# Patient Record
Sex: MALE | Race: WHITE | Employment: FULL TIME | URBAN - METROPOLITAN AREA
[De-identification: names, ages, dates, MRNs, and addresses within clinical notes are randomized per-mention and may not be internally consistent; named-entity substitution may affect disease eponyms.]

---

## 2017-05-30 ENCOUNTER — EMPLOYEE WELLNESS (OUTPATIENT)
Dept: OTHER | Age: 37
End: 2017-05-30

## 2017-05-30 LAB
CHOLESTEROL, TOTAL: 198 MG/DL (ref 0–199)
FASTING: YES
GLUCOSE BLD-MCNC: 95 MG/DL (ref 74–109)
HDLC SERPL-MCNC: 42 MG/DL (ref 40–90)
LDL CHOLESTEROL CALCULATED: 144 MG/DL
TRIGL SERPL-MCNC: 61 MG/DL (ref 0–199)

## 2017-08-15 ENCOUNTER — HOSPITAL ENCOUNTER (OUTPATIENT)
Dept: MRI IMAGING | Age: 37
Discharge: HOME | End: 2017-08-15
Payer: COMMERCIAL

## 2017-08-15 DIAGNOSIS — M25.561 ACUTE PAIN OF RIGHT KNEE: ICD-10-CM

## 2017-08-15 PROCEDURE — 73721 MRI JNT OF LWR EXTRE W/O DYE: CPT

## 2017-11-07 ENCOUNTER — HOSPITAL ENCOUNTER (OUTPATIENT)
Age: 37
Discharge: HOME | End: 2017-11-07
Payer: COMMERCIAL

## 2017-11-07 LAB
ALBUMIN SERPL-MCNC: 4.7 G/DL (ref 3.5–5.1)
ALP BLD-CCNC: 67 U/L (ref 38–126)
ALT SERPL-CCNC: 30 U/L (ref 11–66)
ANION GAP SERPL CALCULATED.3IONS-SCNC: 15 MEQ/L (ref 8–16)
AST SERPL-CCNC: 22 U/L (ref 5–40)
BILIRUB SERPL-MCNC: 0.3 MG/DL (ref 0.3–1.2)
BUN BLDV-MCNC: 16 MG/DL (ref 7–22)
CALCIUM SERPL-MCNC: 9.5 MG/DL (ref 8.5–10.5)
CHLORIDE BLD-SCNC: 102 MEQ/L (ref 98–111)
CO2: 25 MEQ/L (ref 23–33)
CREAT SERPL-MCNC: 0.8 MG/DL (ref 0.4–1.2)
GFR SERPL CREATININE-BSD FRML MDRD: > 90 ML/MIN/1.73M2
GLUCOSE BLD-MCNC: 104 MG/DL (ref 70–108)
HCT VFR BLD CALC: 42.2 % (ref 42–52)
HEMOGLOBIN: 14.1 GM/DL (ref 14–18)
MCH RBC QN AUTO: 29.4 PG (ref 27–31)
MCHC RBC AUTO-ENTMCNC: 33.4 GM/DL (ref 33–37)
MCV RBC AUTO: 88.1 FL (ref 80–94)
MRSA SCREEN RT-PCR: NEGATIVE
PDW BLD-RTO: 13.8 % (ref 11.5–14.5)
PLATELET # BLD: 292 THOU/MM3 (ref 130–400)
PMV BLD AUTO: 8.8 MCM (ref 7.4–10.4)
POTASSIUM SERPL-SCNC: 4.2 MEQ/L (ref 3.5–5.2)
RBC # BLD: 4.79 MILL/MM3 (ref 4.7–6.1)
SODIUM BLD-SCNC: 142 MEQ/L (ref 135–145)
TOTAL PROTEIN: 7.4 G/DL (ref 6.1–8)
WBC # BLD: 8.5 THOU/MM3 (ref 4.8–10.8)

## 2017-11-07 PROCEDURE — 87641 MR-STAPH DNA AMP PROBE: CPT

## 2017-11-07 PROCEDURE — 80053 COMPREHEN METABOLIC PANEL: CPT

## 2017-11-07 PROCEDURE — 36415 COLL VENOUS BLD VENIPUNCTURE: CPT

## 2017-11-07 PROCEDURE — 85027 COMPLETE CBC AUTOMATED: CPT

## 2017-11-20 ENCOUNTER — HOSPITAL ENCOUNTER (OUTPATIENT)
Dept: PHYSICAL THERAPY | Age: 37
Setting detail: THERAPIES SERIES
Discharge: HOME | End: 2017-11-20
Payer: COMMERCIAL

## 2017-11-20 PROCEDURE — 97110 THERAPEUTIC EXERCISES: CPT

## 2017-11-20 PROCEDURE — 97161 PT EVAL LOW COMPLEX 20 MIN: CPT

## 2017-11-20 ASSESSMENT — PAIN SCALES - GENERAL: PAINLEVEL_OUTOF10: 4

## 2017-11-20 ASSESSMENT — PAIN DESCRIPTION - LOCATION: LOCATION: LEG;KNEE

## 2017-11-20 ASSESSMENT — PAIN DESCRIPTION - PAIN TYPE: TYPE: CHRONIC PAIN

## 2017-11-20 ASSESSMENT — PAIN DESCRIPTION - ORIENTATION: ORIENTATION: RIGHT

## 2017-11-20 NOTE — PROGRESS NOTES
Caregiver Present: Yes (wife, Arturo Batista)  Comments: Follow up with Dr. Slick Smith on 11/28/17. Subjective: Patient reports of right ACL reconstruction with allograft on 11/17/17. He reports he injured his right leg playing volleyball. Sustained tibial plateau fracture,  MRI in August with ACL tear. .  Pain level 5/10 at right knee. Noted difficulty raising left leg up on bed. Limite strength and pain present. No difficutlies walking with crutches or entering exiting home. Been trying to put a little more pressure on Left leg when walking. Pain:  Patient Currently in Pain: Yes  Pain Assessment: 0-10  Pain Level: 4  Pain Type: Chronic pain  Pain Location: Leg, Knee  Pain Orientation: Right     Social/Functional History:    Lives With: Spouse  Type of Home: House  Home Layout: Two level, Able to Live on Main level with bedroom/bathroom  Home Access: Stairs to enter without rails  Entrance Stairs - Number of Steps: 2 + 1 steps   Home Equipment: Crutches     Bathroom Shower/Tub: Tub/Shower unit  Bathroom Toilet: Standard  Bathroom Accessibility: Accessible       ADL Assistance: Independent  Homemaking Assistance: Independent  Ambulation Assistance: Independent  Transfer Assistance: Independent    Active : Yes  Mode of Transportation: Car  Occupation: Full time employment  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 Objective        Observation/Palpation  Palpation: negative tenderness at right calf, not redness noted at leg or warmth. Note incisional sites with no drainage upon removal of dressing. Note mild drainage from hemovac tube site but stopped when pressure applied to site. Observation: Note dressingds and hemovac at right knee. Patient ambulating with bilateral axillary crutches toe touchdown wb. Knee immobilizer in place. ROM RLE: right knee ROM 0 to 55 degrees flexion. ROM LLE: left knee 0-140 degrees flexion. Comment: Right knee strength: not assessed due to recent surgery. Note hip strength flexors unable to lift to do SLR Required 90% assist to complete. Comment: LLE strength 5/5. Tone RLE  RLE Tone: Normotonic  Tone LLE  LLE Tone: Normotonic                  Overall Sensation Status: WNL           Supine to Sit: Minimal assistance (Assist with RLE. )  Sit to Supine: Minimal assistance (assist with RLE)                Transfers  Sit to Stand: Modified independent  Stand to sit: Modified independent            WB Status: 20-30 lbs wb through RLE using axillary crutches with knee immobilizaer RLE  Ambulation 1  Surface: carpet  Device: Axillary Crutches  Other Apparatus: Knee Immobilizer  Assistance: Modified Independent  Quality of Gait: Note proper gait pattern using bilateral axillary crutches with Toe touch to PWB RLE. Distance: within clinic                   Balance  Comments: 0 for balance at this time due to use of axillary crutches and recent surgery. Exercises  Exercise 1: ankle pumps, quad set, gluteal sets x10 reps   Exercise 2: heelslides x10   Exercise 3: assisted SLR 90% assist 2x5. Exercise 4: dressing change with removal of hemovac drain. mild drainage with pressure applied. No draiinage following 2 minutes of pressure. Activity Tolerance:  Activity Tolerance: Patient Tolerated treatment well, Patient limited by pain    Assessment: Body structures, Functions, Activity limitations: Decreased functional mobility , Decreased ROM, Decreased strength, Decreased ADL status  Assessment: Note patient only 3 days postop. Dressing removed along with hemovac drain. Note decreased knee ROM and strength, Gait with axillary crutches with toe touch wb. Patient to be followed with ACL protocol per Dr. Ciera Hebert. Will follow 2-3x per week for 8-12 weeks. Prognosis: Good  Discharge Recommendations: Continue to assess pending progress    Patient Education:  Patient Education: Patient educated in plan of care.  Patient initiated in ankle pumps,quad set and gluteal sets. heelslides active assistive, assisted SLR 90%. Cold/ice application. Watch for drainage from drain site. Apply pressure. Plan:  Times per week: 2-3x per week  Plan weeks: 8 weeks  Specific instructions for Next Treatment: progress with heelslides, 4 way leg raises, patellar mobilization, ICE. estim if indicated.  ice. See Dr. Alcaraz Never Protocol. Current Treatment Recommendations: Strengthening, ROM, Gait Training, Home Exercise Program, Stair training, Modalities (estim for muscle reeducation. )    History: Personal factors or comorbidities that impact plan of care - Low Complexity: no personal factors or comorbidities    Examination: Body structures and functions, activity limitations, participation restrictions; using standardized tests and measures - Moderate Complexity: 3 or morebody structures and functional, activity limitations and/or participation restrictions. See restrictions and objective section above for details. Clinical Presentation: Low - Stable and Uncomplicated: due to recent ACL reconstruction right knee post op day 3 working on ROM and strength, gait pattern/ambulation     Decision Making: Low Complexity due to see above for explanations. Decision making was based on patient assessment and decision making process in determining plan of care and establishing reasonable expectations for measurable functional outcomes. Evaluation Complexity: Based on the findings of patient history, examination, clinical presentation, and decision making during this evaluation, the evaluation of Lord Ball  is of low complexity. Goals:  Patient goals : To have less knee pain and walk eventually without crutches, return to work.      Short term goals  Time Frame for Short term goals: 4 weeks  Short term goal 1: Patient to report of decreased pain levels at right knee from 7/10 to no greater than 4-5/10 with walking with

## 2017-11-21 ENCOUNTER — HOSPITAL ENCOUNTER (OUTPATIENT)
Dept: PHYSICAL THERAPY | Age: 37
Setting detail: THERAPIES SERIES
Discharge: HOME | End: 2017-11-21
Payer: COMMERCIAL

## 2017-11-21 PROCEDURE — 97110 THERAPEUTIC EXERCISES: CPT

## 2017-11-21 ASSESSMENT — PAIN DESCRIPTION - LOCATION: LOCATION: KNEE

## 2017-11-21 ASSESSMENT — PAIN SCALES - GENERAL: PAINLEVEL_OUTOF10: 5

## 2017-11-21 ASSESSMENT — PAIN DESCRIPTION - ORIENTATION: ORIENTATION: RIGHT

## 2017-11-21 NOTE — PROGRESS NOTES
[address] [facility]     Time In: 1104  Time Out: 1969  Minutes: 48  Timed Code Treatment Minutes: 48 Minutes     Date: 2017  Patient Name: Mauro Borrero,  Gender:  male        CSN: [id number]   : 1980  (42 y.o.)       Referring Practitioner: Brandon Workman MD      Diagnosis: right ACL reconstruction   Treatment Diagnosis: s/p right knee ACL reconstruction surgery with difficulty walking and limited ADLs due to ROM and strength deficits. Additional Pertinent Hx: 17 injury to right knee with  tibial plateau fracture. 2017 continued healing fracture of tibial plateau,MRI revealed ACL tear. Surgery on 17 for ACL reconstruction with allograft                    General:  PT Visit Information  Onset Date: 17  PT Insurance Information: Medical [place]  aquatic therapy covered, modalites covered. Total # of Visits to Date: 2  Plan of Care/Certification Expiration Date: 01/15/18  Progress Note Counter: 2/10 for PN. Subjective:  Family / Caregiver Present: Yes  Comments: Follow up with Dr. Hortensia Vincent on 17. Subjective: Pt reporting pain 5/10- today and using ice and CPM. Pt reporting having very minimal drainage after drain was pulled at evaluation. Pain:  Patient Currently in Pain: Yes  Pain Assessment: 0-10  Pain Level: 5  Pain Location: Knee  Pain Orientation: Right      Objective   Exercises  Exercise 1: ankle pumps x 15. Quad set 15 x 5 seconds  Exercise 2: heel slides x10, heel slides assisted with blue strap x 10  Exercise 3: assisted SLR 90% assist 2x5. Left side lying hip abduction x 10 Mod I  Exercise 4: Right knee range of motion 0-80 degrees. Exercise 5: Long sitting hamstring stretch with L LE off bed 15 seconds x 3. R calf stretch with strap 15 seconds x 3.   Exercise 6: Airdyne seat 5 x 5 minutes rocking   Exercise 7: Hanging LE's off edge of bed x 1 minute  Exercise 8: Educated on patellar mobs on L knee both direction. Still has ace bandage on right knee. Activity Tolerance:  Activity Tolerance: Patient Tolerated treatment well    Assessment:  Assessment: Progressions made per Dr. Dora Marshall protocol with pt tolerating well. Did have noted hamstring tightness and still needing assist with SLR, but knee flexion has improved. Pt reporting pain 6/10 at end of session. Prognosis: Good       Patient Education:  Patient Education: Educated to wear shorts next session so [place] stimulation cane be done. Continue with HEP. and Gave hand out for side lying hip abduction, hamstring stretch and calf stretch. Plan:  Times per week: 2-3x per week  Plan weeks: 8 weeks  Specific instructions for Next Treatment: progress with heelslides, 4 way leg raises, patellar mobilization, ICE. estim if indicated.  ice. See Dr. Tashia Ybarra Protocol. Current Treatment Recommendations: Strengthening, ROM, Gait Training, Home Exercise Program, Stair training, Modalities  Plan Comment: Continue per POC. Goals:  Patient goals : To have less knee pain and walk eventually without crutches, return to work. Short term goals  Time Frame for Short term goals: 4 weeks  Short term goal 1: Patient to report of decreased pain levels at right knee from 7/10 to no greater than 4-5/10 with walking with axillary crutches and at rest.   Short term goal 2: Patient to demonstrate increased right knee ROM 0 degrees to 120 degrees flexion with increased ease with exercises   Short term goal 3: Patient to demonstrate increased strength RLE with strength at hip flexors 3/5 with ability to SLR and lift leg onto/off mat table. Short term goal 4: Patient to demonstrate improved walking pattern with functional knee brace with equal step length and improved wt shift.      Long term goals  Time Frame for Long term goals : 8 weeks  Long term goal 1: Patient to report of decreased pain levels at right knee from 4-5/10 to no greater thatn 0-2/10 with walking and ex program.   Long term goal 2: Patient to demonstrate full active right knee ROM 0-140 degrees flexion with normal gait pattern and ability to negotiate steps reciprocally. Long term goal 3: Patient to demonstrate increased strength at hip and knee musculature RLE 5/5 with increased stabiity in ambulation on level /unlevel terrain. Long term goal 4: Patient to demonstrate increased balance in SLS for 30 sec to 45 seconds with   increased progression to single Heelraises, side stepping on treadmill. Long term goal 5: Patient to demonstrate independence in home program with ability to meet above goals and revise past 8 weeks of PT.            Royal Pederson, [id number]

## 2017-11-22 ENCOUNTER — HOSPITAL ENCOUNTER (OUTPATIENT)
Dept: PHYSICAL THERAPY | Age: 37
Setting detail: THERAPIES SERIES
Discharge: HOME | End: 2017-11-22
Payer: COMMERCIAL

## 2017-11-22 PROCEDURE — 97110 THERAPEUTIC EXERCISES: CPT

## 2017-11-22 PROCEDURE — 97032 APPL MODALITY 1+ESTIM EA 15: CPT

## 2017-11-22 ASSESSMENT — PAIN DESCRIPTION - PAIN TYPE: TYPE: CHRONIC PAIN;SURGICAL PAIN

## 2017-11-22 ASSESSMENT — PAIN SCALES - GENERAL: PAINLEVEL_OUTOF10: 5

## 2017-11-22 ASSESSMENT — PAIN DESCRIPTION - ORIENTATION: ORIENTATION: RIGHT

## 2017-11-22 ASSESSMENT — PAIN DESCRIPTION - LOCATION: LOCATION: KNEE

## 2017-11-27 ENCOUNTER — HOSPITAL ENCOUNTER (OUTPATIENT)
Dept: PHYSICAL THERAPY | Age: 37
Setting detail: THERAPIES SERIES
Discharge: HOME | End: 2017-11-27
Payer: COMMERCIAL

## 2017-11-27 PROCEDURE — 97110 THERAPEUTIC EXERCISES: CPT

## 2017-11-27 ASSESSMENT — PAIN DESCRIPTION - ORIENTATION: ORIENTATION: RIGHT

## 2017-11-27 ASSESSMENT — PAIN SCALES - GENERAL: PAINLEVEL_OUTOF10: 6

## 2017-11-27 ASSESSMENT — PAIN DESCRIPTION - PAIN TYPE: TYPE: CHRONIC PAIN;SURGICAL PAIN

## 2017-11-27 ASSESSMENT — PAIN DESCRIPTION - LOCATION: LOCATION: KNEE

## 2017-11-27 NOTE — PROGRESS NOTES
hip flexors 3/5 with ability to SLR and lift leg onto/off mat table. Short term goal 4: Patient to demonstrate improved walking pattern with functional knee brace with equal step length and improved wt shift. Long term goals  Time Frame for Long term goals : 8 weeks  Long term goal 1: Patient to report of decreased pain levels at right knee from 4-5/10 to no greater thatn 0-2/10 with walking and ex program.   Long term goal 2: Patient to demonstrate full active right knee ROM 0-140 degrees flexion with normal gait pattern and ability to negotiate steps reciprocally. Long term goal 3: Patient to demonstrate increased strength at hip and knee musculature RLE 5/5 with increased stabiity in ambulation on level /unlevel terrain. Long term goal 4: Patient to demonstrate increased balance in SLS for 30 sec to 45 seconds with   increased progression to single Heelraises, side stepping on treadmill.     Long term goal 5: Patient to demonstrate independence in home program with ability to meet above goals and revise past 8 weeks of [address] [address] [address] [place], [address] [facility]

## 2017-11-29 ENCOUNTER — HOSPITAL ENCOUNTER (OUTPATIENT)
Dept: PHYSICAL THERAPY | Age: 37
Setting detail: THERAPIES SERIES
Discharge: HOME | End: 2017-11-29
Payer: COMMERCIAL

## 2017-11-29 PROCEDURE — 97032 APPL MODALITY 1+ESTIM EA 15: CPT

## 2017-11-29 PROCEDURE — 97110 THERAPEUTIC EXERCISES: CPT

## 2017-11-29 ASSESSMENT — PAIN SCALES - GENERAL: PAINLEVEL_OUTOF10: 4

## 2017-11-29 ASSESSMENT — PAIN DESCRIPTION - ORIENTATION: ORIENTATION: RIGHT

## 2017-11-29 ASSESSMENT — PAIN DESCRIPTION - LOCATION: LOCATION: KNEE

## 2017-11-29 ASSESSMENT — PAIN DESCRIPTION - PAIN TYPE: TYPE: SURGICAL PAIN

## 2017-11-29 NOTE — PROGRESS NOTES
2x5.   Exercise 6: Airdyne seat 4 x 5 minutes rocking   Exercise 8: patellar mobilization with inferior glide  x10. instruction  Exercise 9: [place] estimulation for muscle reeducation  over VMO distal quad and mid muscle belly  Intensity 26 on new machine for 8 ( machine shut off, see assessment)  minutes having patient perform quad sets and with ICE. Exercise 10: cold pack to right knee during estim for 8 minutes          Activity Tolerance: Tolerated well. Assessment: Body structures, Functions, Activity limitations: Decreased functional mobility , Decreased ROM, Decreased strength, Decreased endurance  Assessment: Difficulty setting up [place] estim today trying several machines due to patient not feeling contraction. After several attempts and changing electrodes was able to educate quad muscle for 8 minutes until machine stopped. Apologized to patient. Patient able to achieve straight leg raise without assist with quad lag ~5 degress. Discharge Recommendations: Continue to assess pending progress    Patient Education:  Patient Education: Continue with quad sets 4 way hip, heel slides. Plan:  Times per week: 2-3x per week  Plan weeks: 8 weeks  Specific instructions for Next Treatment: progress with heelslides, 4 way leg raises, patellar mobilization, ICE. estim if indicated.  ice. See Dr. Margaret Storey Protocol. Plan Comment: Continue per POC. Goals:  Patient goals : To have less knee pain and walk eventually without crutches, return to work.      Short term goals  Time Frame for Short term goals: 4 weeks  Short term goal 1: Patient to report of decreased pain levels at right knee from 7/10 to no greater than 4-5/10 with walking with axillary crutches and at rest.   Short term goal 2: Patient to demonstrate increased right knee ROM 0 degrees to 120 degrees flexion with increased ease with exercises   Short term goal 3: Patient to demonstrate increased strength RLE with strength at hip

## 2017-12-01 ENCOUNTER — HOSPITAL ENCOUNTER (OUTPATIENT)
Dept: PHYSICAL THERAPY | Age: 37
Setting detail: THERAPIES SERIES
Discharge: HOME | End: 2017-12-01
Payer: COMMERCIAL

## 2017-12-01 PROCEDURE — 97110 THERAPEUTIC EXERCISES: CPT

## 2017-12-01 ASSESSMENT — PAIN SCALES - GENERAL
PAINLEVEL_OUTOF10: 4
PAINLEVEL_OUTOF10: 4

## 2017-12-01 ASSESSMENT — PAIN DESCRIPTION - LOCATION
LOCATION: KNEE
LOCATION: KNEE

## 2017-12-01 ASSESSMENT — PAIN DESCRIPTION - ORIENTATION
ORIENTATION: RIGHT
ORIENTATION: RIGHT

## 2017-12-01 ASSESSMENT — PAIN DESCRIPTION - PAIN TYPE
TYPE: SURGICAL PAIN
TYPE: SURGICAL PAIN

## 2017-12-01 NOTE — PROGRESS NOTES
[facility]     Time In:   Time Out: [place]  Minutes: 48  Timed Code Treatment Minutes: 48 Minutes     Date: 2017  Patient Name: Louis Sage,  Gender:  male        CSN: [id number]   : 1980  (42 y.o.)       Referring Practitioner: Justin Velez MD      Diagnosis: right ACL reconstruction   Treatment Diagnosis: s/p right knee ACL reconstruction surgery with difficulty walking and limited ADLs due to ROM and strength deficits. Additional Pertinent Hx: 17 injury to right knee with  tibial plateau fracture. 2017 continued healing fracture of tibial plateau,MRI revealed ACL tear. Surgery on 17 for ACL reconstruction with allograft                    General:  PT Visit Information  Onset Date: 17  PT Insurance Information: Medical [place]  aquatic therapy covered, modalites covered. Total # of Visits to Date: 6  Plan of Care/Certification Expiration Date: 01/15/18  Progress Note Counter: 6/10 for PN               Subjective:  Chart Reviewed: Yes  Patient assessed for rehabilitation services?: Yes  Response To Previous Treatment: Patient with no complaints from previous session. Family / Caregiver Present: No  Comments: Follow up with Dr. Ciera Hebert on 18. Subjective: Pt WBAT today. Pt brought ACL brace to session. \"The Dr said I could start wearing it today-I just have to see how I feel. I will still wear the immobilzer, if you want. \"     Pain:  Patient Currently in Pain: Yes  Pain Level: 4  Pain Type: Surgical pain  Pain Location: Knee  Pain Orientation: Right      Objective            Exercises  Exercise 1: quad sets towel roll under ankle x15   Exercise 2: heelslides x10, heel slides added with blue strap x 10  Exercise 3:  SLR  Right  x 5 with quad lag ~ 5 degrees assessed end of session and discussed quad l  Exercise 4: Right knee range of motion 0-4 degrees-105 degrees flexion. Exercise 6: Airdyne seat 4 x 5 minutes rocking   Exercise 11: 4 way hip with RLE yellow theraband x10 (ACL brace onfor R OC and immobilzer R CC)   Exercise 12: B heel/toe rasies, marching x10 with ACL brace -use immobilizer next week until Friday  Trialed in clinic, but recommend continue with immobilizer for 1 more week Pt instructed to use at home, as well.     6 laps walking in // bars focusing on heel strike and toe off. Fairly constant cues to correct-increase R LE step length, heel strike and toe off R LE. ACL brace worn during gait in //  bars today. Mild decreased wt shift R LE in stance. Standing IT Band stretch R LE with immobilizer donned x3 hold 10 sec R LE   Activity Tolerance:  Activity Tolerance: Patient Tolerated treatment well  Activity Tolerance: ~5 degree quad lag noted when assessing SLR. Emphasized TKE with all exs. Trialed ACL brace in // bars with several exs., but recommend immobilzer until next week-re check. ACL brace or immobilizer worn during all standing exs/gait. Slightly less pain after session. \"Feels better. \"  Assessment: Body structures, Functions, Activity limitations: Decreased functional mobility , Decreased ROM, Decreased strength, Decreased endurance  Assessment: Pt WBAT today and progressed CC activites in clinic. Pt wore ACL brace with various exs in // bars, but instructed to don immobilizer for CC 4 way hip R LE. Instructed immobilizer for home until next week. Pt stated:\" he said I could try the ACL brace, but I agree. I'll keep the immobilizer until next week. Pt ambulated with B axillary crutches and emphasized TKE in stance, heel strike and toe off R LE> Pt encouraged to take longer step with R LE and for push off. Cues for posture/hip extension in standing and during gait. Slight improvement with AROM knee flex today.   Prognosis: Good  Discharge Recommendations: Continue to assess pending progress    Patient Education:  Patient Education: 4 way hip

## 2017-12-04 ENCOUNTER — HOSPITAL ENCOUNTER (OUTPATIENT)
Dept: PHYSICAL THERAPY | Age: 37
Setting detail: THERAPIES SERIES
Discharge: HOME | End: 2017-12-04
Payer: COMMERCIAL

## 2017-12-04 PROCEDURE — 97110 THERAPEUTIC EXERCISES: CPT

## 2017-12-04 PROCEDURE — 97032 APPL MODALITY 1+ESTIM EA 15: CPT

## 2017-12-04 ASSESSMENT — PAIN DESCRIPTION - LOCATION: LOCATION: KNEE

## 2017-12-04 ASSESSMENT — PAIN DESCRIPTION - PAIN TYPE: TYPE: SURGICAL PAIN

## 2017-12-04 ASSESSMENT — PAIN DESCRIPTION - ORIENTATION: ORIENTATION: RIGHT

## 2017-12-04 ASSESSMENT — PAIN SCALES - GENERAL: PAINLEVEL_OUTOF10: 3

## 2017-12-04 NOTE — PROGRESS NOTES
[address] [facility]     Time In: 1033  Time Out: 7460  Minutes: 44  Timed Code Treatment Minutes: 44 Minutes     Date: 2017  Patient Name: Colonel Ureña,  Gender:  male        CSN: [id number]   : 1980  (42 y.o.)       Referring Practitioner: Dwight Torres MD      Diagnosis: right ACL reconstruction   Treatment Diagnosis: s/p right knee ACL reconstruction surgery with difficulty walking and limited ADLs due to ROM and strength deficits. Additional Pertinent Hx: 17 injury to right knee with  tibial plateau fracture. 2017 continued healing fracture of tibial plateau,MRI revealed ACL tear. Surgery on 17 for ACL reconstruction with allograft                    General:  PT Visit Information  Onset Date: 17  PT Insurance Information: Medical [place]  aquatic therapy covered, modalites covered. Total # of Visits to Date: 7  Plan of Care/Certification Expiration Date: 01/15/18  Progress Note Counter: 7/10 for PN               Subjective:  Chart Reviewed: Yes  Patient assessed for rehabilitation services?: Yes  Response To Previous Treatment: Patient with no complaints from previous session. Family / Caregiver Present: No  Comments: Follow up with Dr. Rachell Garcia on 18. Subjective: Patient reports that ACL brace has been a little tight due to swelling at right knee. Patient wearing knee immobilizer at beginning of session. Patient followed up with physician, Dr. Rachell Garcia on last Tuesday and doctor was pleased with extension and flexion at knee. Viviana Regalado has functional knee brace now.        Pain:  Patient Currently in Pain: Yes  Pain Assessment: 0-10  Pain Level: 3  Pain Type: Surgical pain  Pain Location: Knee  Pain Orientation: Right      Objective             Exercises  Exercise 5:  4 way leg raises x 20 (home program)   Exercise 6: Airdyne seat 4 x 3 minutes rocking Then adjusted seat to 5 and Modalities  Plan Comment: Continue per POC. Goals:  Patient goals : To have less knee pain and walk eventually without crutches, return to work. Short term goals  Time Frame for Short term goals: 4 weeks  Short term goal 1: Patient to report of decreased pain levels at right knee from 7/10 to no greater than 4-5/10 with walking with axillary crutches and at rest.   Short term goal 2: Patient to demonstrate increased right knee ROM 0 degrees to 120 degrees flexion with increased ease with exercises   Short term goal 3: Patient to demonstrate increased strength RLE with strength at hip flexors 3/5 with ability to SLR and lift leg onto/off mat table. Short term goal 4: Patient to demonstrate improved walking pattern with functional knee brace with equal step length and improved wt shift. Long term goals  Time Frame for Long term goals : 8 weeks  Long term goal 1: Patient to report of decreased pain levels at right knee from 4-5/10 to no greater thatn 0-2/10 with walking and ex program.   Long term goal 2: Patient to demonstrate full active right knee ROM 0-140 degrees flexion with normal gait pattern and ability to negotiate steps reciprocally. Long term goal 3: Patient to demonstrate increased strength at hip and knee musculature RLE 5/5 with increased stabiity in ambulation on level /unlevel terrain. Long term goal 4: Patient to demonstrate increased balance in SLS for 30 sec to 45 seconds with   increased progression to single Heelraises, side stepping on treadmill.     Long term goal 5: Patient to demonstrate independence in home program with ability to meet above goals and revise past 8 weeks of [facility] [address] [place]qu[place], [address]

## 2017-12-06 ENCOUNTER — HOSPITAL ENCOUNTER (OUTPATIENT)
Dept: PHYSICAL THERAPY | Age: 37
Setting detail: THERAPIES SERIES
Discharge: HOME | End: 2017-12-06
Payer: COMMERCIAL

## 2017-12-06 PROCEDURE — 97110 THERAPEUTIC EXERCISES: CPT

## 2017-12-06 ASSESSMENT — PAIN SCALES - GENERAL: PAINLEVEL_OUTOF10: 4

## 2017-12-06 ASSESSMENT — PAIN DESCRIPTION - LOCATION: LOCATION: KNEE

## 2017-12-06 ASSESSMENT — PAIN DESCRIPTION - ORIENTATION: ORIENTATION: RIGHT

## 2017-12-06 NOTE — PROGRESS NOTES
[address]     Time In: 6535  Time Out: [address]  Minutes: 55  Timed Code Treatment Minutes: 45 Minutes     Date: 2017  Patient Name: Rhonda Norman,  Gender:  male        CSN: [id number]   : 1980  (42 y.o.)       Referring Practitioner: Luana Mckeon MD      Diagnosis: right ACL reconstruction   Treatment Diagnosis: s/p right knee ACL reconstruction surgery with difficulty walking and limited ADLs due to ROM and strength deficits. Additional Pertinent Hx: 17 injury to right knee with  tibial plateau fracture. 2017 continued healing fracture of tibial plateau,MRI revealed ACL tear. Surgery on 17 for ACL reconstruction with allograft         General:  PT Visit Information  Onset Date: 17  PT Insurance Information: [facility] [place]  aquatic therapy covered, modalites covered. Total # of Visits to Date: 8  Plan of Care/Certification Expiration Date: 01/15/18  Progress Note Counter: 8/10 for PN             Subjective:  Chart Reviewed: Yes  Response To Previous Treatment: Patient with no complaints from previous session. Family / Caregiver Present: No  Comments: Follow up with Dr. Christian Foster on 18. Subjective: Patient states his exercises are going well at home ,\"with 20-30 reps of leg raises. \" \" MY IT band gives me the most discomfort. \"     Pain:  Patient Currently in Pain: Yes  Pain Assessment: 0-10  Pain Level: 4  Pain Location: Knee  Pain Orientation: Right    Objective         Exercises  Exercise 2: heelslides x10, heel slides added with blue strap x 10  Exercise 3:  SLR  Right  x 5 with quad lag ~ 5  Exercise 4: Right knee range of motion 0 degrees-123 degrees flexion.   Exercise 6: Airdyne seat 5 x 3 minutes rocking Then adjusted seat to 5 and able to do full revolutions 2 minutes  Exercise 11: 4 way hip with R LLE /LLE with  yellow theraband x10 (ACL brace on), red theraband behind knee x 15 hold x 3 seconds (right) work into extension with quad set wb through RLE (functional knee brace on)   Exercise 12: bilateral heelraises/toe raises x15, marches with no pain   Exercise 13: Shallow wall sits x 10 hold x 5 with brace on. Exercise 14: Standing BAPS L1 right with left foot on the floor. F/B, S/S CW, CCW x 10  Exercise 15: Rockerboard F/B, S/S x 10 B UE, Balance x 30 seconds  1UE. Exercise 16: Steps ups 4\" CCR lateral x 10  Exercise 17: ICE after with 2 pillows  for elevation x 10 minutes       Activity Tolerance: Tolerated well with pain 2/10. Assessment: Body structures, Functions, Activity limitations: Decreased functional mobility , Decreased ROM, Decreased strength, Decreased endurance  Assessment: Right knee ROM 0 123. Added  rockerboard, Baps,  lateral step ups and  wall sits with good tolerance with patient rporting \" feels good. \" Pain after ice 2/10. Discharge Recommendations: Continue to assess pending progress    Patient Education:  Patient Education: Continue with HEP. Add wall sits and lateral CCR no more than 4 inch step. Issue red theraband for TKE. Plan:  Times per week: 2-3x per week  Plan weeks: 8 weeks  Specific instructions for Next Treatment: progress with heelslides, 4 way leg raises, patellar mobilization, ICE. estim if indicated.  ice. See Dr. Sabra Buchanan Protocol. Current Treatment Recommendations: Strengthening, ROM, Gait Training, Home Exercise Program, Stair training, Modalities  Plan Comment: Continue per POC. Goals:  Patient goals : To have less knee pain and walk eventually without crutches, return to work.      Short term goals  Time Frame for Short term goals: 4 weeks  Short term goal 1: Patient to report of decreased pain levels at right knee from 7/10 to no greater than 4-5/10 with walking with axillary crutches and at rest.   Short term goal 2: Patient to demonstrate increased right knee ROM 0 degrees to 120 degrees flexion with increased ease with exercises   Short term goal 3: Patient to demonstrate increased strength RLE with strength at hip flexors 3/5 with ability to SLR and lift leg onto/off mat table. Short term goal 4: Patient to demonstrate improved walking pattern with functional knee brace with equal step length and improved wt shift. Long term goals  Time Frame for Long term goals : 8 weeks  Long term goal 1: Patient to report of decreased pain levels at right knee from 4-5/10 to no greater thatn 0-2/10 with walking and ex program.   Long term goal 2: Patient to demonstrate full active right knee ROM 0-140 degrees flexion with normal gait pattern and ability to negotiate steps reciprocally. Long term goal 3: Patient to demonstrate increased strength at hip and knee musculature RLE 5/5 with increased stabiity in ambulation on level /unlevel terrain. Long term goal 4: Patient to demonstrate increased balance in SLS for 30 sec to 45 seconds with   increased progression to single Heelraises, side stepping on treadmill. Long term goal 5: Patient to demonstrate independence in home program with ability to meet above goals and revise past 8 weeks of PT.       Tameka El  PTA [id number]

## 2017-12-08 ENCOUNTER — HOSPITAL ENCOUNTER (OUTPATIENT)
Dept: PHYSICAL THERAPY | Age: 37
Setting detail: THERAPIES SERIES
Discharge: HOME | End: 2017-12-08
Payer: COMMERCIAL

## 2017-12-08 PROCEDURE — 97110 THERAPEUTIC EXERCISES: CPT

## 2017-12-08 ASSESSMENT — PAIN DESCRIPTION - PAIN TYPE: TYPE: SURGICAL PAIN

## 2017-12-08 ASSESSMENT — PAIN SCALES - GENERAL: PAINLEVEL_OUTOF10: 2

## 2017-12-08 ASSESSMENT — PAIN DESCRIPTION - ORIENTATION: ORIENTATION: RIGHT

## 2017-12-08 ASSESSMENT — PAIN DESCRIPTION - LOCATION: LOCATION: KNEE

## 2017-12-08 NOTE — PROGRESS NOTES
[address] [facility]     Time In: 7897  Time Out: 0468  Minutes: 48  Timed Code Treatment Minutes: 38 Minutes     Date: 2017  Patient Name: Inessa Cisneros,  Gender:  male        CSN: [id number]   : 1980  (42 y.o.)       Referring Practitioner: Willie Flowers MD      Diagnosis: right ACL reconstruction   Treatment Diagnosis: s/p right knee ACL reconstruction surgery with difficulty walking and limited ADLs due to ROM and strength deficits. Additional Pertinent Hx: 17 injury to right knee with  tibial plateau fracture. 2017 continued healing fracture of tibial plateau,MRI revealed ACL tear. Surgery on 17 for ACL reconstruction with allograft         General:  PT Visit Information  Onset Date: 17  PT Insurance Information: [employer] [place]  aquatic therapy covered, modalites covered. Total # of Visits to Date: 5  Plan of Care/Certification Expiration Date: 01/15/18  Progress Note Counter: 9/10 for PN               Subjective:  Chart Reviewed: Yes  Response To Previous Treatment: Patient with no complaints from previous session. Family / Caregiver Present: No  Comments: Follow up with Dr. Angel Araiza on 18. Subjective: Patient states he was alittle sore from added exercise last visit but ok now. \" I'm making progress. \" \" I feel better when I walk on it. \" Patient reports soreness to right hip and heel numbness, \" I hope my back isn't messed up. \"     Pain:  Patient Currently in Pain: Yes  Pain Assessment: 0-10  Pain Level: 2  Pain Type: Surgical pain  Pain Location: Knee  Pain Orientation: Right  Pain Radiating Towards: right hip 5/10    Objective        Exercises  Exercise 1: quad sets towel roll under ankle x15   Exercise 2: heelslides x10, heel slides added with blue strap x 10  Exercise 3:  SLR  Right  x 5 with quad lag ~ 5  Exercise 4: Right knee range of motion 0 degrees-126 degrees flexion. Exercise 6: Airdyne seat 5 x 2minutes then seat 4 x 3 minutes  Exercise 11: 4 way hip with R LLE /LLE with  yellow theraband x15 (ACL brace on), red theraband behind knee x 20 hold x 5seconds (right) work into extension with quad set wb through RLE (functional knee brace on)   Exercise 13: Shallow wall sits x 10 hold x 5 with brace on. Exercise 14: Standing BAPS L1 right with left foot on the floor. F/B, S/S CW, CCW x 10  Exercise 15: Rockerboard F/B, S/S x 10 1 UE, Balance x 30 seconds  1UE. Exercise 16: Steps ups 4\" CCR lateral x 10  Exercise 17: ICE after  10 minutes         Activity Tolerance: Tolerated well. Assessment: Body structures, Functions, Activity limitations: Decreased functional mobility , Decreased ROM, Decreased strength, Decreased endurance  Assessment: Right knee ROM 0 126. Addressed strengthening with brace on. Patient using one crutch to focus on gait pattern. Patient Education:  Patient Education: Amanda Powell with HEP. Plan:  Times per week: 2-3x per week  Plan weeks: 8 weeks  Specific instructions for Next Treatment: progress with heelslides, 4 way leg raises, patellar mobilization, ICE. estim if indicated.  ice. See Dr. Sabra Buchanan Protocol. Current Treatment Recommendations: Strengthening, ROM, Gait Training, Home Exercise Program, Stair training, Modalities  Plan Comment: Continue per POC. Goals:  Patient goals : To have less knee pain and walk eventually without crutches, return to work.      Short term goals  Time Frame for Short term goals: 4 weeks  Short term goal 1: Patient to report of decreased pain levels at right knee from 7/10 to no greater than 4-5/10 with walking with axillary crutches and at rest.   Short term goal 2: Patient to demonstrate increased right knee ROM 0 degrees to 120 degrees flexion with increased ease with exercises   Short term goal 3: Patient to demonstrate increased strength RLE with strength at hip flexors 3/5 with ability to SLR

## 2017-12-11 ENCOUNTER — HOSPITAL ENCOUNTER (OUTPATIENT)
Dept: PHYSICAL THERAPY | Age: 37
Setting detail: THERAPIES SERIES
Discharge: HOME | End: 2017-12-11
Payer: COMMERCIAL

## 2017-12-11 PROCEDURE — 97110 THERAPEUTIC EXERCISES: CPT

## 2017-12-11 ASSESSMENT — PAIN DESCRIPTION - LOCATION: LOCATION: KNEE

## 2017-12-11 ASSESSMENT — PAIN SCALES - GENERAL: PAINLEVEL_OUTOF10: 2

## 2017-12-11 ASSESSMENT — PAIN DESCRIPTION - PAIN TYPE: TYPE: SURGICAL PAIN

## 2017-12-11 ASSESSMENT — PAIN DESCRIPTION - ORIENTATION: ORIENTATION: RIGHT

## 2017-12-11 NOTE — PROGRESS NOTES
per week. Prognosis: Good  Discharge Recommendations: Continue to assess pending progress    Patient Education:  Patient Education: Cotinue with HEP. May ambulate household distances with functional knee brace on without axillary crutches. Use axillary crutches for community distances. Plan:  Times per week: 2-3x per week  Plan weeks: 8 weeks  Specific instructions for Next Treatment: progress with leg press, hamstring curls and closed chain ex. balance. Treadmill   follow protocol for ACL reconstruction Dr Bran Calderón. Current Treatment Recommendations: Strengthening, ROM, Gait Training, Home Exercise Program, Stair training, Modalities  Plan Comment: Continue per POC. Goals:  Patient goals : To have less knee pain and walk eventually without crutches, return to work. NOT MET Patient continues to be on medical leave from work. Patient ambulating limited distances without axillary crutches with functional kinee brace on. Ambulates >100 feet with axillary crutch. Short term goals  Time Frame for Short term goals: 4 weeks PN 12/11/17  Short term goal 1: Patient to report of decreased pain levels at right knee from 7/10 to no greater than 4-5/10 with walking with axillary crutches and at rest.   Short term goal 2: Patient to demonstrate increased right knee ROM 0 degrees to 120 degrees flexion with increased ease with exercises GOAL MET Right knee ROM 0 degrees to 128 to 130 degrees . see long term goal for revision. Short term goal 3: Patient to demonstrate increased strength RLE with strength at hip flexors 3/5 with ability to SLR and lift leg onto/off mat table. GOAL MET hip flexors 4 to 4-/5 with MMT. see long term goal for revision. Short term goal 4: Patient to demonstrate improved walking pattern with functional knee brace with equal step length and improved wt shift. GOAL PARTIALLY MET Patietn ambulating with functional knee brace with axillary crutch.  Ambulating tentaviely without crutch with decreased wt shift through RLE>     Long term goals  Time Frame for Long term goals : 8 weeks 12/11/17 Below goals are ongoing. Long term goal 1: Patient to report of decreased pain levels at right knee from 4-5/10 to no greater thatn 0-2/10 with walking and ex program.   Long term goal 2: Patient to demonstrate full active right knee ROM 0-140 degrees flexion with normal gait pattern and ability to negotiate steps reciprocally. Long term goal 3: Patient to demonstrate increased strength at hip and knee musculature RLE 5/5 with increased stabiity in ambulation on level /unlevel terrain. Long term goal 4: Patient to demonstrate increased balance in SLS for 30 sec to 45 seconds with   increased progression to single Heelraises, side stepping on treadmill.     Long term goal 5: Patient to demonstrate independence in home program with ability to meet above goals and revise past 8 weeks of [address] [facility] [address], [address] [facility]

## 2017-12-13 ENCOUNTER — APPOINTMENT (OUTPATIENT)
Dept: PHYSICAL THERAPY | Age: 37
End: 2017-12-13
Payer: COMMERCIAL

## 2017-12-14 ENCOUNTER — HOSPITAL ENCOUNTER (OUTPATIENT)
Dept: PHYSICAL THERAPY | Age: 37
Setting detail: THERAPIES SERIES
Discharge: HOME | End: 2017-12-14
Payer: COMMERCIAL

## 2017-12-14 PROCEDURE — 97110 THERAPEUTIC EXERCISES: CPT

## 2017-12-14 ASSESSMENT — PAIN DESCRIPTION - PAIN TYPE: TYPE: SURGICAL PAIN

## 2017-12-14 ASSESSMENT — PAIN SCALES - GENERAL: PAINLEVEL_OUTOF10: 2

## 2017-12-14 ASSESSMENT — PAIN DESCRIPTION - LOCATION: LOCATION: KNEE

## 2017-12-14 ASSESSMENT — PAIN DESCRIPTION - ORIENTATION: ORIENTATION: RIGHT

## 2017-12-14 NOTE — PROGRESS NOTES
[address] [facility]     Time In: 0945  Time Out:   Minutes: 55  Timed Code Treatment Minutes: 55 Minutes     Date: 2017  Patient Name: Joel Torres,  Gender:  male        CSN: [id number]   : 1980  (42 y.o.)       Referring Practitioner: Jayashree Chand MD      Diagnosis: right ACL reconstruction   Treatment Diagnosis: s/p right knee ACL reconstruction surgery with difficulty walking and limited ADLs due to ROM and strength deficits. Additional Pertinent Hx: 17 injury to right knee with  tibial plateau fracture. 2017 continued healing fracture of tibial plateau,MRI revealed ACL tear. Surgery on 17 for ACL reconstruction with allograft                    General:  PT Visit Information  Onset Date: 17  PT Insurance Information: Medical [place]  aquatic therapy covered, modalites covered. Total # of Visits to Date: 6  Plan of Care/Certification Expiration Date: 01/15/18  Progress Note Counter:  1/10 for next PN               Subjective:  Patient assessed for rehabilitation services?: Yes  Response To Previous Treatment: Patient with no complaints from previous session. Family / Caregiver Present: No  Comments: Follow up with Dr. Slick Smith on 18. Subjective: Pt reports \" knee feels good. It's the ITBand. It really hurts. \" Pt weaning from 1 crutch-Uses in and out of clinic.      Pain:  Patient Currently in Pain: Yes  Pain Level: 2  Pain Type: Surgical pain  Pain Location: Knee  Pain Orientation: Right      Objective       Exercises  Exercise 6: Airdyne seat 5 for 6 minutes  Exercise 11: 4 way hip with R LLE /LLE with  red theraband 2x10 (ACL brace on), red theraband behind knee x 20 hold x 5seconds (right) work into extension with quad set wb through RLE (functional knee brace on)   Exercise 12: prone hamstring curls x10 pain free ROM   Exercise 13: Shallow wall sits x 10 hold x 5 with brace on. Exercise 14: Standing BAPS L1 right FWB-L toe on board for balance . F/B, S/S CW, CCW x 10 brace on  Exercise 15: Rockerboard F/B, S/S 2x 10 1 UE, Balance x 30 seconds  1UE brace on  Exercise 16: Steps ups 4\" CCR lateral x 15 fwd step ups x12-13  Exercise 18: IT band stretch 3x hold 15 seconds. Step stretch for calf x 3 hold 10 sec. Exercise 19: Leg press squats  0-45 degrees with brace on 60# x15   Exercise 20: Treadmill x 1 min fwd 1.7 mph cues for heel strike/toe off and equal step length  fairly good technique . 8mph x1 min retro  brace on for all     Mod massage x 2-3 min end of session R ITBand in L sidelying with towel rolls between knees. Instructed for home and try paint roller. TP releaser R piriformis x 1-2 min -instructed for HEP. Activity Tolerance:  Activity Tolerance: Patient Tolerated treatment well  Activity Tolerance: Added prone hamstring curl and gentle standing calf stretch    Assessment: Body structures, Functions, Activity limitations: Decreased functional mobility , Decreased ROM, Decreased strength, Decreased endurance  Assessment: AROM 130. Added prone hamstring, gentle calf stretch, treadmill, and leg press-per protocol. No increased pain. Brace worn in clinic except for bike. Monitor pain. Demonstarted how to have wife help with piriformis TP release and ITBnad massage. Prognosis: Good  Discharge Recommendations: Continue to assess pending progress    Patient Education:  Patient Education: Cotinue with HEP. Added prone hamstring curls, gentle standing calf stretch. Continue wearing functional knee brace  without axillary crutches. Use axillary crutches for community distances. Plan:  Current Treatment Recommendations: Strengthening, ROM, Gait Training, Home Exercise Program, Stair training, Modalities  Plan Comment: Continue per POC. Goals:  Patient goals : To have less knee pain and walk eventually without crutches, return to work.  NOT MET Patient continues to be on medical leave from work. Patient ambulating limited distances without axillary crutches with functional kinee brace on. Ambulates >100 feet with axillary crutch. Short term goals  Time Frame for Short term goals: 4 weeks PN 12/11/17  Short term goal 1: Patient to report of decreased pain levels at right knee from 7/10 to no greater than 4-5/10 with walking with axillary crutches and at rest.   Short term goal 2: Patient to demonstrate increased right knee ROM 0 degrees to 120 degrees flexion with increased ease with exercises GOAL MET Right knee ROM 0 degrees to 128 to 130 degrees . see long term goal for revision. Short term goal 3: Patient to demonstrate increased strength RLE with strength at hip flexors 3/5 with ability to SLR and lift leg onto/off mat table. GOAL MET hip flexors 4 to 4-/5 with MMT. see long term goal for revision. Short term goal 4: Patient to demonstrate improved walking pattern with functional knee brace with equal step length and improved wt shift. GOAL PARTIALLY MET Patietn ambulating with functional knee brace with axillary crutch. Ambulating tentaviely without crutch with decreased wt shift through RLE>     Long term goals  Time Frame for Long term goals : 8 weeks 12/11/17 Below goals are ongoing. Long term goal 1: Patient to report of decreased pain levels at right knee from 4-5/10 to no greater thatn 0-2/10 with walking and ex program.   Long term goal 2: Patient to demonstrate full active right knee ROM 0-140 degrees flexion with normal gait pattern and ability to negotiate steps reciprocally. Long term goal 3: Patient to demonstrate increased strength at hip and knee musculature RLE 5/5 with increased stabiity in ambulation on level /unlevel terrain. Long term goal 4: Patient to demonstrate increased balance in SLS for 30 sec to 45 seconds with   increased progression to single Heelraises, side stepping on treadmill.     Long term goal 5:

## 2017-12-15 ENCOUNTER — HOSPITAL ENCOUNTER (OUTPATIENT)
Dept: PHYSICAL THERAPY | Age: 37
Setting detail: THERAPIES SERIES
Discharge: HOME | End: 2017-12-15
Payer: COMMERCIAL

## 2017-12-15 PROCEDURE — 97110 THERAPEUTIC EXERCISES: CPT

## 2017-12-15 ASSESSMENT — PAIN SCALES - GENERAL: PAINLEVEL_OUTOF10: 1

## 2017-12-15 ASSESSMENT — PAIN DESCRIPTION - PAIN TYPE: TYPE: SURGICAL PAIN

## 2017-12-15 ASSESSMENT — PAIN DESCRIPTION - ORIENTATION: ORIENTATION: RIGHT

## 2017-12-15 ASSESSMENT — PAIN DESCRIPTION - LOCATION: LOCATION: KNEE

## 2017-12-15 NOTE — PROGRESS NOTES
[place]     Time In: 7407  Time Out: L[address] 159  Minutes: 54  Timed Code Treatment Minutes: 44 Minutes     Date: 12/15/2017  Patient Name: Arin Nuno,  Gender:  male        CSN: [id number]   : 1980  (42 y.o.)       Referring Practitioner: Quiana Rinaldi MD      Diagnosis: right ACL reconstruction   Treatment Diagnosis: s/p right knee ACL reconstruction surgery with difficulty walking and limited ADLs due to ROM and strength deficits. Additional Pertinent Hx: 17 injury to right knee with  tibial plateau fracture. 2017 continued healing fracture of tibial plateau,MRI revealed ACL tear. Surgery on 17 for ACL reconstruction with allograft         General:  PT Visit Information  Onset Date: 17  PT Insurance Information: [facility] [place]  aquatic therapy covered, modalites covered. Total # of Visits to Date: 15  Plan of Care/Certification Expiration Date: 01/15/18  Progress Note Counter:  2/10 for next PN               Subjective:  Patient assessed for rehabilitation services?: Yes  Response To Previous Treatment: Patient with no complaints from previous session. Family / Caregiver Present: No  Comments: Follow up with Dr. Lory Muro on 18. Subjective: Pt states his knee is feeling really good, but the hip is hurting. Pt states his knee is 1/10 pain and hip 5/10. Pt states he's starting to use is 1 crutch less and less.        Pain:  Patient Currently in Pain: Yes  Pain Assessment: 0-10  Pain Level: 1  Pain Type: Surgical pain  Pain Location: Knee  Pain Orientation: Right      Objective    Exercises  Exercise 6: Airdyne seat 5 for 6 minutes  Exercise 10: cold pack to right knee at end of session for 10 minutes   Exercise 11: 4 way hip with R LLE /LLE with  red theraband 2x10 (ACL brace on), red theraband behind knee x 20 hold x 5seconds (right) work into extension with quad set wb through RLE (functional knee brace on)   Exercise 12: prone hamstring curls x10 pain free ROM   Exercise 13: Shallow wall sits x 10 hold x 5 with brace on. Exercise 14: Standing BAPS L1 right FWB-L toe on board for balance . F/B, S/S CW, CCW x 10 brace on  Exercise 15: Rockerboard F/B, S/S 2x 10 1 UE, Balance x 30 seconds  1UE. brace on  Exercise 16: Steps ups 4\" CCR lateral x 15 fwd step ups x 15  Exercise 18: IT band stretch 3x hold 15 seconds. Step stretch for calf x 3 hold 10 sec. Exercise 19: Leg press squats  0-45 degrees with brace on 60# x15   Exercise 20: Treadmill x 2 min fwd 1.7 mph cues for heel strike/toe off and equal step length  fairly good technique 1.0 mph x2 min retro  brace on for all          Activity Tolerance:  Activity Tolerance: Patient Tolerated treatment well    Assessment: Body structures, Functions, Activity limitations: Decreased functional mobility , Decreased ROM, Decreased strength, Decreased endurance  Assessment: AROM 131. Increased time with treadmill with good tolerance. Brace worn in clinic except for bike and hamstring curls. Pt noted 2/10 pain at end of session. Prognosis: Good  Discharge Recommendations: Continue to assess pending progress    Patient Education:  Patient Education: Cotinue with HEP. Added prone hamstring curls, gentle standing calf stretch. Continue wearing functional knee brace  without axillary crutches. Use axillary crutches for community distances. Plan:  Current Treatment Recommendations: Strengthening, ROM, Gait Training, Home Exercise Program, Stair training, Modalities  Plan Comment: Continue per POC. Goals:  Patient goals : To have less knee pain and walk eventually without crutches, return to work. NOT MET Patient continues to be on medical leave from work. Patient ambulating limited distances without axillary crutches with functional kinee brace on. Ambulates >100 feet with axillary crutch.      Short term goals  Time Frame for Short term goals: 4 weeks PN 12/11/17  Short term goal 1: Patient to report of decreased pain levels at right knee from 7/10 to no greater than 4-5/10 with walking with axillary crutches and at rest.   Short term goal 2: Patient to demonstrate increased right knee ROM 0 degrees to 120 degrees flexion with increased ease with exercises GOAL MET Right knee ROM 0 degrees to 128 to 130 degrees . see long term goal for revision. Short term goal 3: Patient to demonstrate increased strength RLE with strength at hip flexors 3/5 with ability to SLR and lift leg onto/off mat table. GOAL MET hip flexors 4 to 4-/5 with MMT. see long term goal for revision. Short term goal 4: Patient to demonstrate improved walking pattern with functional knee brace with equal step length and improved wt shift. GOAL PARTIALLY MET Patietn ambulating with functional knee brace with axillary crutch. Ambulating tentaviely without crutch with decreased wt shift through RLE>     Long term goals  Time Frame for Long term goals : 8 weeks 12/11/17 Below goals are ongoing. Long term goal 1: Patient to report of decreased pain levels at right knee from 4-5/10 to no greater thatn 0-2/10 with walking and ex program.   Long term goal 2: Patient to demonstrate full active right knee ROM 0-140 degrees flexion with normal gait pattern and ability to negotiate steps reciprocally. Long term goal 3: Patient to demonstrate increased strength at hip and knee musculature RLE 5/5 with increased stabiity in ambulation on level /unlevel terrain. Long term goal 4: Patient to demonstrate increased balance in SLS for 30 sec to 45 seconds with   increased progression to single Heelraises, side stepping on treadmill.     Long term goal 5: Patient to demonstrate independence in home program with ability to meet above goals and revise past 8 weeks of PT.           Alysa Sam, PTA

## 2017-12-18 ENCOUNTER — APPOINTMENT (OUTPATIENT)
Dept: PHYSICAL THERAPY | Age: 37
End: 2017-12-18
Payer: COMMERCIAL

## 2017-12-20 ENCOUNTER — HOSPITAL ENCOUNTER (OUTPATIENT)
Dept: PHYSICAL THERAPY | Age: 37
Setting detail: THERAPIES SERIES
Discharge: HOME | End: 2017-12-20
Payer: COMMERCIAL

## 2017-12-20 PROCEDURE — 97110 THERAPEUTIC EXERCISES: CPT

## 2017-12-20 ASSESSMENT — PAIN SCALES - GENERAL: PAINLEVEL_OUTOF10: 3

## 2017-12-20 NOTE — PROGRESS NOTES
[address] [facility]     Time In: 0802  Time Out: 0840  Minutes: 38  Timed Code Treatment Minutes: 38 Minutes     Date: 2017  Patient Name: Candelaria Ya,  Gender:  male        CSN: [id number]   : 1980  (42 y.o.)       Referring Practitioner: Abrahan Matthew MD      Diagnosis: right ACL reconstruction   Treatment Diagnosis: s/p right knee ACL reconstruction surgery with difficulty walking and limited ADLs due to ROM and strength deficits. Additional Pertinent Hx: 17 injury to right knee with  tibial plateau fracture. 2017 continued healing fracture of tibial plateau,MRI revealed ACL tear. Surgery on 17 for ACL reconstruction with allograft          General:  PT Visit Information  Onset Date: 17  PT Insurance Information: Medical [place]  aquatic therapy covered, modalites covered. Total # of Visits to Date: 15  Plan of Care/Certification Expiration Date: 01/15/18  Progress Note Counter: 4/10 for next PN             Subjective:  Response To Previous Treatment: Patient with no complaints from previous session. Family / Caregiver Present: No  Comments: Follow up with Dr. Marie Hurtado on 18. Subjective: Patient states the clicking comes and goes but not having too much pain. Pain:  Patient Currently in Pain: Yes  Pain Assessment: 0-10  Pain Level: 3 (Right knee)    Objective         Exercises  Exercise 6: Airdyne (seat 5) x5 minutes  Exercise 11: 4-way hip with green theraband x20 Bilateral (ACL brace on), green theraband behind knee x20 hold 5 seconds (right) work into extension with quad set wb through Right LE (functional knee brace on)   Exercise 12: Prone hamstring curls x10 pain free ROM   Exercise 13: Shallow wall sits 10x hold 5 seconds with brace on.    Exercise 14: Standing BAPS L2 right FWB-L toe on board for balance  F/B, S/S CW, CCW x15 with brace on  Exercise 15: Rockerboard F/B, S/S x20 1 UE, Balance x30 seconds with 1UE brace on  Exercise 16: Steps ups 4\" Right CC lateral x15, forward step ups x15  Exercise 19: Leg press squats 0-45 degrees with brace on 60# 2x10  Exercise 20: Treadmill x3 minutes forward 1.8 mph increasing to 2 mph (demonstrating good heel strike/toe off and equal step length) then 1.2 mph x2 minutes retro - brace on for all        Activity Tolerance:  Activity Tolerance: Patient Tolerated treatment well    Assessment: Body structures, Functions, Activity limitations: Decreased functional mobility , Decreased ROM, Decreased strength, Decreased endurance, Decreased balance  Assessment: Patient tolerated exercises fairly well today with pain decreased to 1/10 by end of session. Reminded to try ice massage at home and can try using heat to IT band with self massage. Discharge Recommendations: Continue to assess pending progress    Patient Education:  Patient Education: Continue with HEP. Can do ICE massage to right lateral knee. Heat and massage to Right IT band    Plan:  Times per week: 2-3x per week  Plan weeks: 8 weeks  Current Treatment Recommendations: Strengthening, ROM, Gait Training, Home Exercise Program, Stair training, Modalities  Plan Comment: Continue per POC. Goals:  Patient goals : To have less knee pain and walk eventually without crutches, return to work. Short term goals  Time Frame for Short term goals: 4 weeks PN 12/11/17  Short term goal 1: Patient to report of decreased pain levels at right knee from 7/10 to no greater than 4-5/10 with walking with axillary crutches and at rest.   Short term goal 4: Patient to demonstrate improved walking pattern with functional knee brace with equal step length and improved wt shift. Long term goals  Time Frame for Long term goals : 8 weeks 12/11/17 Below goals are ongoing.     Long term goal 1: Patient to report of decreased pain levels at right knee from 4-5/10 to no greater thatn 0-2/10 with walking and ex program.   Long term goal 2: Patient to demonstrate full active right knee ROM 0-140 degrees flexion with normal gait pattern and ability to negotiate steps reciprocally. Long term goal 3: Patient to demonstrate increased strength at hip and knee musculature RLE 5/5 with increased stabiity in ambulation on level /unlevel terrain. Long term goal 4: Patient to demonstrate increased balance in SLS for 30 sec to 45 seconds with increased progression to single Heelraises, side stepping on treadmill. Long term goal 5: Patient to demonstrate independence in home program with ability to meet above goals and revise past 8 weeks of PT. Silvana Alvarez.  Koffi Caraballo, [facility] [place], 12/20/2017

## 2017-12-22 ENCOUNTER — HOSPITAL ENCOUNTER (OUTPATIENT)
Dept: PHYSICAL THERAPY | Age: 37
Setting detail: THERAPIES SERIES
Discharge: HOME | End: 2017-12-22
Payer: COMMERCIAL

## 2017-12-22 PROCEDURE — 97110 THERAPEUTIC EXERCISES: CPT

## 2017-12-22 ASSESSMENT — PAIN DESCRIPTION - ORIENTATION: ORIENTATION: RIGHT

## 2017-12-22 ASSESSMENT — PAIN DESCRIPTION - LOCATION: LOCATION: KNEE

## 2017-12-22 ASSESSMENT — PAIN SCALES - GENERAL: PAINLEVEL_OUTOF10: 1

## 2017-12-22 NOTE — PROGRESS NOTES
levels at right knee from 4-5/10 to no greater thatn 0-2/10 with walking and ex program.   Long term goal 2: Patient to demonstrate full active right knee ROM 0-140 degrees flexion with normal gait pattern and ability to negotiate steps reciprocally. Long term goal 3: Patient to demonstrate increased strength at hip and knee musculature RLE 5/5 with increased stabiity in ambulation on level /unlevel terrain. Long term goal 4: Patient to demonstrate increased balance in SLS for 30 sec to 45 seconds with increased progression to single Heelraises, side stepping on treadmill. Long term goal 5: Patient to demonstrate independence in home program with ability to meet above goals and revise past 8 weeks of PT.       Jerry Parsons  PTA [id number]

## 2017-12-26 ENCOUNTER — HOSPITAL ENCOUNTER (OUTPATIENT)
Dept: PHYSICAL THERAPY | Age: 37
Setting detail: THERAPIES SERIES
Discharge: HOME | End: 2017-12-26
Payer: COMMERCIAL

## 2017-12-26 PROCEDURE — 97110 THERAPEUTIC EXERCISES: CPT

## 2017-12-26 ASSESSMENT — PAIN DESCRIPTION - PAIN TYPE: TYPE: SURGICAL PAIN

## 2017-12-26 ASSESSMENT — PAIN SCALES - GENERAL: PAINLEVEL_OUTOF10: 2

## 2017-12-26 ASSESSMENT — PAIN DESCRIPTION - LOCATION: LOCATION: KNEE

## 2017-12-26 NOTE — PROGRESS NOTES
[place]     Time In: 5396  Time Out: 3948  Minutes: 44  Timed Code Treatment Minutes: 44 Minutes     Date: 2017  Patient Name: Durga Rodriges,  Gender:  male        CSN: [id number]   : 1980  (42 y.o.)       Referring Practitioner: Carlos Dubon MD      Diagnosis: right ACL reconstruction   Treatment Diagnosis: s/p right knee ACL reconstruction surgery with difficulty walking and limited ADLs due to ROM and strength deficits. Additional Pertinent Hx: 17 injury to right knee with  tibial plateau fracture. 2017 continued healing fracture of tibial plateau,MRI revealed ACL tear. Surgery on 17 for ACL reconstruction with allograft                    General:  PT Visit Information  Onset Date: 17  PT Insurance Information: Medical [place]  aquatic therapy covered, modalites covered. Total # of Visits to Date: 12  Plan of Care/Certification Expiration Date: 01/15/18  Progress Note Counter: 6/10 for PN               Subjective:  Chart Reviewed: Yes  Patient assessed for rehabilitation services?: Yes  Response To Previous Treatment: Patient with no complaints from previous session. Family / Caregiver Present: No  Comments: Follow up with Dr. Kiran Kennedy on 18. Subjective: Patient states he has been doing well. Tolerating ex well. IT band region bothers him with walking. My knee range is coming along ok. Notes aching at  down leg more so than at the knee. Pain:  Patient Currently in Pain: Yes  Pain Assessment: 0-10  Pain Level: 2  Pain Type: Surgical pain  Pain Location: Knee      Objective      Exercises  Exercise 6: Airdyne (seat 4) x5 minutes  Exercise 11: Nautilus 4 way hip: 25# x15 right/left LE   Exercise 12: Nautilus: seated leg curl  30# x15   Exercise 13: Shallow wall sits 10x hold 5 seconds with brace on.    Exercise 15: Rockerboard F/B, S/S x20 no  UE, Balance x30 seconds with 1UE brace on  Exercise 16: Steps ups 6 inch forward and lateral right/left LE   Exercise 19: Leg press squats 0-45 degrees with brace on 60# 2x15,  toe presses 60# x15   Exercise 20: Treadmill x3 minutes forward 1.8 mph for 4 minutes increasing to 2 mph for 2 minutes  (demonstrating good heel strike/toe off and equal step length) then 1.2 mph x2 minutes retro - brace on for all          Activity Tolerance:  Activity Tolerance: Patient Tolerated treatment well  Activity Tolerance: Tolerated additional exercises well . Mild distal patellar pain with lateral step ups. Assessment: Body structures, Functions, Activity limitations: Decreased functional mobility , Decreased ROM, Decreased strength  Assessment: progressed to 4 way hip with Nautilus, Toe presses-Nautilus, 6 inch step ups instead of 4 inch step. Continues to have IT band discomfort. less knee pain. Prognosis: Good  Discharge Recommendations: Continue to assess pending progress    Patient Education:  Patient Education: Continue with HEP. Can do ICE massage to right lateral knee. Heat and massage to Right IT band                      Plan:  Times per week: 2-3x per week  Plan weeks: 8 weeks  Specific instructions for Next Treatment: progress with leg press, hamstring curls and closed chain ex. balance. Treadmill   follow protocol for ACL reconstruction Dr Giuliana Jameson. Plan Comment: Continue per POC. Goals:  Patient goals : To have less knee pain and walk eventually without crutches, return to work. Short term goals  Time Frame for Short term goals: 4 weeks PN 12/11/17  Short term goal 1: Patient to report of decreased pain levels at right knee from 7/10 to no greater than 4-5/10 with walking with axillary crutches and at rest.   Short term goal 2: Patient to demonstrate increased right knee ROM 0 degrees to 120 degrees flexion with increased ease with exercises GOAL MET Right knee ROM 0 degrees to 128 to 130 degrees .  see long term goal for revision. Short term goal 3: Patient to demonstrate increased strength RLE with strength at hip flexors 3/5 with ability to SLR and lift leg onto/off mat table. GOAL MET hip flexors 4 to 4-/5 with MMT. see long term goal for revision. Short term goal 4: Patient to demonstrate improved walking pattern with functional knee brace with equal step length and improved wt shift. Long term goals  Time Frame for Long term goals : 8 weeks 12/11/17 Below goals are ongoing. Long term goal 1: Patient to report of decreased pain levels at right knee from 4-5/10 to no greater thatn 0-2/10 with walking and ex program.   Long term goal 2: Patient to demonstrate full active right knee ROM 0-140 degrees flexion with normal gait pattern and ability to negotiate steps reciprocally. Long term goal 3: Patient to demonstrate increased strength at hip and knee musculature RLE 5/5 with increased stabiity in ambulation on level /unlevel terrain. Long term goal 4: Patient to demonstrate increased balance in SLS for 30 sec to 45 seconds with increased progression to single Heelraises, side stepping on treadmill.     Long term goal 5: Patient to demonstrate independence in home program with ability to meet above goals and revise past 8 weeks of [address] [address], [address]

## 2017-12-27 ENCOUNTER — HOSPITAL ENCOUNTER (OUTPATIENT)
Dept: PHYSICAL THERAPY | Age: 37
Setting detail: THERAPIES SERIES
Discharge: HOME | End: 2017-12-27
Payer: COMMERCIAL

## 2017-12-27 PROCEDURE — 97110 THERAPEUTIC EXERCISES: CPT

## 2017-12-27 ASSESSMENT — PAIN SCALES - GENERAL: PAINLEVEL_OUTOF10: 1

## 2017-12-27 ASSESSMENT — PAIN DESCRIPTION - ORIENTATION: ORIENTATION: RIGHT

## 2017-12-27 ASSESSMENT — PAIN DESCRIPTION - PAIN TYPE: TYPE: SURGICAL PAIN

## 2017-12-27 ASSESSMENT — PAIN DESCRIPTION - LOCATION: LOCATION: KNEE

## 2017-12-27 NOTE — PROGRESS NOTES
knee from 4-5/10 to no greater thatn 0-2/10 with walking and ex program.   Long term goal 2: Patient to demonstrate full active right knee ROM 0-140 degrees flexion with normal gait pattern and ability to negotiate steps reciprocally. Long term goal 3: Patient to demonstrate increased strength at hip and knee musculature RLE 5/5 with increased stabiity in ambulation on level /unlevel terrain. Long term goal 4: Patient to demonstrate increased balance in SLS for 30 sec to 45 seconds with increased progression to single Heelraises, side stepping on treadmill. Long term goal 5: Patient to demonstrate independence in home program with ability to meet above goals and revise past 8 weeks of PT.       Betzaida Solomon  PTA [id number]

## 2017-12-29 ENCOUNTER — HOSPITAL ENCOUNTER (OUTPATIENT)
Dept: PHYSICAL THERAPY | Age: 37
Setting detail: THERAPIES SERIES
Discharge: HOME | End: 2017-12-29
Payer: COMMERCIAL

## 2017-12-29 PROCEDURE — 97110 THERAPEUTIC EXERCISES: CPT

## 2017-12-29 ASSESSMENT — PAIN SCALES - GENERAL: PAINLEVEL_OUTOF10: 1

## 2017-12-29 ASSESSMENT — PAIN DESCRIPTION - ORIENTATION: ORIENTATION: RIGHT

## 2017-12-29 ASSESSMENT — PAIN DESCRIPTION - LOCATION: LOCATION: KNEE

## 2017-12-29 ASSESSMENT — PAIN DESCRIPTION - PAIN TYPE: TYPE: SURGICAL PAIN

## 2017-12-29 NOTE — PROGRESS NOTES
[place]     Time In: 9063  Time Out: 1001  Minutes: 49  Timed Code Treatment Minutes: 52 Minutes     Date: 2017  Patient Name: Blue Ray,  Gender:  male        CSN: [id number]   : 1980  (42 y.o.)       Referring Practitioner: Robby Celeste MD      Diagnosis: right ACL reconstruction   Treatment Diagnosis: s/p right knee ACL reconstruction surgery with difficulty walking and limited ADLs due to ROM and strength deficits. Additional Pertinent Hx: 17 injury to right knee with  tibial plateau fracture. 2017 continued healing fracture of tibial plateau,MRI revealed ACL tear. Surgery on 17 for ACL reconstruction with allograft         General:  PT Visit Information  Onset Date: 17  Total # of Visits to Date: 25  Plan of Care/Certification Expiration Date: 01/15/18  Progress Note Counter: 8/10 for PN               Subjective:  Chart Reviewed: Yes  Response To Previous Treatment: Patient with no complaints from previous session. Family / Caregiver Present: No  Comments: Follow up with Dr. Chirag Salinas on 18. Subjective: Patient states knee pain 1/10, right leg 3/10. Pain:  Patient Currently in Pain: Yes  Pain Assessment: 0-10  Pain Level: 1  Pain Type: Surgical pain  Pain Location: Knee  Pain Orientation: Right  Pain Radiating Towards: right knee 1/10, right leg/ITband area 3/10. Right foot, 2/10  Objective    Exercises  Exercise 6: Airdyne (seat 4) x5 minutes  Exercise 11: Nautilus 4 way hip: 25# x15 right/left LE   Exercise 12: Nautilus: seated leg curl  30# x 20   Exercise 13: Shallow wall sits 10x hold 5 seconds with brace on. Exercise 15: Rockerboard F/B, S/S x20 no  UE, Balance x30 seconds with 1UE brace on  Exercise 16: Steps ups 6 inch forward and lateral right/left LE  x15  Exercise 17: Stools scoots x 60 ft  Exercise 18:  SLS x 1 minute seconds right.    Exercise

## 2018-01-02 ENCOUNTER — HOSPITAL ENCOUNTER (OUTPATIENT)
Dept: PHYSICAL THERAPY | Age: 38
Setting detail: THERAPIES SERIES
Discharge: HOME | End: 2018-01-02
Payer: COMMERCIAL

## 2018-01-02 PROCEDURE — 97110 THERAPEUTIC EXERCISES: CPT

## 2018-01-02 ASSESSMENT — PAIN DESCRIPTION - LOCATION: LOCATION: LEG

## 2018-01-02 ASSESSMENT — PAIN SCALES - GENERAL: PAINLEVEL_OUTOF10: 2

## 2018-01-02 ASSESSMENT — PAIN DESCRIPTION - ORIENTATION: ORIENTATION: RIGHT

## 2018-01-02 ASSESSMENT — PAIN DESCRIPTION - PAIN TYPE: TYPE: CHRONIC PAIN

## 2018-01-04 ENCOUNTER — HOSPITAL ENCOUNTER (OUTPATIENT)
Dept: PHYSICAL THERAPY | Age: 38
Setting detail: THERAPIES SERIES
Discharge: HOME | End: 2018-01-04
Payer: COMMERCIAL

## 2018-01-04 PROCEDURE — 97110 THERAPEUTIC EXERCISES: CPT

## 2018-01-04 ASSESSMENT — PAIN SCALES - GENERAL: PAINLEVEL_OUTOF10: 1

## 2018-01-04 NOTE — PROGRESS NOTES
[address] [facility]     Time In: 0930  Time Out: 1020  Minutes: 50  Timed Code Treatment Minutes: 50 Minutes     Date: 2018  Patient Name: Dorina Land,  Gender:  male        CSN: [id number]   : 1980  (42 y.o.)       Referring Practitioner: Kathryn Mcclellan MD      Diagnosis: right ACL reconstruction   Treatment Diagnosis: s/p right knee ACL reconstruction surgery with difficulty walking and limited ADLs due to ROM and strength deficits. Additional Pertinent Hx: 17 injury to right knee with  tibial plateau fracture. 2017 continued healing fracture of tibial plateau,MRI revealed ACL tear. Surgery on 17 for ACL reconstruction with allograft         General:  PT Visit Information  Onset Date: 17  PT Insurance Information: Medical [place]  aquatic therapy covered, modalites covered. (Patient had 18 visits prior to 18)  Total # of Visits to Date: 2  Plan of Care/Certification Expiration Date: 18  Progress Note Counter:  for PN             Subjective:  Chart Reviewed: Yes  Patient assessed for rehabilitation services?: Yes  Response To Previous Treatment: Patient with no complaints from previous session. Family / Caregiver Present: No  Comments: Follow up with Dr. Nick Smiley on 18. Subjective: Patient reports having minimal pain today but had a new sorenss in the lateral Right lower leg after last therapy session. Continues to have IT band soreness but is working on massage at home.       Pain:  Patient Currently in Pain: Yes  Pain Assessment: 0-10  Pain Level: 1 (Right knee)    Objective         Exercises  Exercise 1: Next session: Weighted walking and sidestepping on treadmill  Exercise 2: Airdyne (seat 4) x5 minutes  Exercise 3: Treadmill forward 2 mph x5 minutes (demonstrating good heel strike/toe off and equal step length) then 1.2 mph x 3 minutes retro - brace on for all

## 2018-01-09 ENCOUNTER — HOSPITAL ENCOUNTER (OUTPATIENT)
Dept: PHYSICAL THERAPY | Age: 38
Setting detail: THERAPIES SERIES
Discharge: HOME | End: 2018-01-09
Payer: COMMERCIAL

## 2018-01-09 PROCEDURE — 97110 THERAPEUTIC EXERCISES: CPT

## 2018-01-09 ASSESSMENT — PAIN SCALES - GENERAL: PAINLEVEL_OUTOF10: 0

## 2018-01-09 ASSESSMENT — PAIN DESCRIPTION - ORIENTATION: ORIENTATION: RIGHT

## 2018-01-09 ASSESSMENT — PAIN DESCRIPTION - PAIN TYPE: TYPE: SURGICAL PAIN

## 2018-01-09 ASSESSMENT — PAIN DESCRIPTION - LOCATION: LOCATION: KNEE

## 2018-01-09 NOTE — PROGRESS NOTES
[place]     Time In: 8817  Time Out: Doris  Minutes: 50  Timed Code Treatment Minutes: 50 Minutes     Date: 2018  Patient Name: Parveen Iniguez,  Gender:  male        CSN: [id number]   : 1980  (42 y.o.)       Referring Practitioner: Mary Mendez MD      Diagnosis: right ACL reconstruction   Treatment Diagnosis: s/p right knee ACL reconstruction surgery with difficulty walking and limited ADLs due to ROM and strength deficits. Additional Pertinent Hx: 17 injury to right knee with  tibial plateau fracture. 2017 continued healing fracture of tibial plateau,MRI revealed ACL tear. Surgery on 17 for ACL reconstruction with allograft                    General:  PT Visit Information  Onset Date: 17  PT Insurance Information: Medical [place]  aquatic therapy covered, modalites covered. (Patient had 18 visits prior to 18)  Total # of Visits to Date: 3  Plan of Care/Certification Expiration Date: 18  Progress Note Counter: 2/6 for PN               Subjective:  Chart Reviewed: Yes  Response To Previous Treatment: Patient with no complaints from previous session. Family / Caregiver Present: No  Comments: Follow up with Dr. Itz Tan on 18. Subjective: Patient feels his knee is doing pretty good. Exercises at home and walking. Notes less IT band pain compared to 2 weeks ago. More of a dull ache now. Patient follows  up with Dr. Itz Tan today. Pain:  Patient Currently in Pain: No  Pain Assessment: 0-10  Pain Level: 0  Pain Type: Surgical pain  Pain Location: Knee  Pain Orientation: Right  Pain Radiating Towards: IT band region 2/10       Objective                 Exercises  Exercise 1: weighted walking 30# forward and backward, lateral to right and left  2x each direction.    Exercise 2: Airdyne (seat 4) x5 minutes  Exercise 3: Treadmill forward 2 mph x5 minutes (demonstrating good heel strike/toe off and equal step length) then 1.2 mph x 3 minutes retro, 2 minutes sidestepping on TM to right and left each at 0.8 mph. - brace on for all   Exercise 5: Leg press squats 0-45 degrees with brace on 72# 2x15,  toe presses 72# x 20   Exercise 6: SLS x1 minute Right; Right single leg heel raise x10  Exercise 7: Stool scoots x80 feet   Exercise 8: Steps ups 6 inch forward and lateral Right CC x20; Right eccentric lowering 4 inch step x10  Exercise 11: Nautilus: 4-way hip 35# x15 right/left LE   Exercise 12: Nautilus: Seated leg curl  40# x 10   Exercise 13: Shallow wall sits 15x hold 5 seconds with brace on. Exercise 14: Standing BAPS L2 right FWB-L toe on board for balance  F/B, S/S CW, CCW x20 with brace on          Activity Tolerance:  Activity Tolerance: Patient Tolerated treatment well  Activity Tolerance: tolerated additional wt on 4 way hip and leg curl. Added sidestepping on TM and weighted walking. No pain reported at knee. Assessment: Body structures, Functions, Activity limitations: Decreased functional mobility , Decreased ROM, Decreased strength  Assessment: Added weighted walking and sidestepping on TM. Increased reps and weights as noted under acivity tolerance. Progressing well with ACL rehab with increased strength and balance. Prognosis: Good  Discharge Recommendations: Continue to assess pending progress    Patient Education:  Patient Education: Continue with HEP                      Plan:  Times per week: 2-3x per week  Plan weeks: 8 weeks  Specific instructions for Next Treatment: Continue with ex progressing per ACL protocol of Dr. Raquel Acosta. Progress weight and reps as tolerated. Continue to work on balance. Current Treatment Recommendations: Strengthening, ROM, Gait Training, Home Exercise Program, Stair training, Modalities  Plan Comment: Continue per POC. Goals:  Patient goals : To have less knee pain and walk eventually without crutches, return to work.

## 2018-01-11 ENCOUNTER — HOSPITAL ENCOUNTER (OUTPATIENT)
Dept: PHYSICAL THERAPY | Age: 38
Setting detail: THERAPIES SERIES
Discharge: HOME | End: 2018-01-11
Payer: COMMERCIAL

## 2018-01-11 PROCEDURE — 97110 THERAPEUTIC EXERCISES: CPT

## 2018-01-11 NOTE — PROGRESS NOTES
[place]     Time In: 930  Time Out: 1025  Minutes: 55  Timed Code Treatment Minutes: 55 Minutes     Date: 2018  Patient Name: Jayda Schultz,  Gender:  male        CSN: [id number]   : 1980  (42 y.o.)       Referring Practitioner: Sofia Grande MD      Diagnosis: right ACL reconstruction   Treatment Diagnosis: s/p right knee ACL reconstruction surgery with difficulty walking and limited ADLs due to ROM and strength deficits. Additional Pertinent Hx: 17 injury to right knee with  tibial plateau fracture. 2017 continued healing fracture of tibial plateau,MRI revealed ACL tear. Surgery on 17 for ACL reconstruction with allograft          General:  PT Visit Information  Onset Date: 17  PT Insurance Information: Medical [place]  aquatic therapy covered, modalites covered. (Patient had 18 visits prior to 18)  Total # of Visits to Date: 4  Plan of Care/Certification Expiration Date: 18  Progress Note Counter: 3/6 for PN             Subjective:  Chart Reviewed: Yes  Response To Previous Treatment: Patient with no complaints from previous session. Family / Caregiver Present: No  Comments: Follow up with Dr. Olivia Longoria on 3/13/18. Subjective: Patient states he saw the doctor on Tuesday and is doing well. Reports Dr. Olivia Longoria thought things were coming along well but still wants him to be off work. Patient states he still has the numbness in his Right heel but the doctor thinks it will resolve itself. Pain:  Patient Currently in Pain: No     Objective         Exercises  Exercise 1: Weighted walking 30# forward and backward, lateral to right and left x3 laps each direction.  Weighted alternating forward lunges 15# x10 bilateral - cues for technique and small range, brace on  Exercise 3: Treadmill forward 2 mph x5 minutes (demonstrating good heel strike/toe off and equal step length) then 1.2 mph x 3 minutes retro, 2 minutes sidestepping on TM to right and left each at 0.8 mph. - brace on for all   Exercise 5: Leg press squats 0-45 degrees with brace on 72# 2x15,  toe presses 72# x20   Exercise 6: SLS x1 minute Right; Right single leg heel raise x10  Exercise 7: Stool scoots x100 feet   Exercise 8: Steps ups 6 inch forward and lateral Right CC x20; Right eccentric lowering 4 inch step x10  Exercise 9: Rockerboard forward/back, side to side x20 no UE, Balance x1 minute each way  Exercise 10: Elliptical (ramp 1/resistance 1) x5 minutes   Exercise 11: Nautilus: 4-way hip 35# x15 right/left LE   Exercise 12: Nautilus: Seated leg curl  40# x15  Exercise 13: Shallow wall sits 15x hold 5 seconds with brace on. Exercise 14: Standing BAPS L2 right FWB-L toe on board for balance  F/B, S/S CW, CCW x20 with brace on        Activity Tolerance:  Activity Tolerance: Patient Tolerated treatment well  Activity Tolerance: Denies knee pain at end of session. Assessment:  Assessment: Progressed with elliptical and small range weighted lunges today. Patient tolerated well. Occasional discomfort \"pressure\" under knee cap but denies pain at end of session. Patient continues to demonstrate Right quad weakness, especially with small lunges. Prognosis: Good     Patient Education:  Patient Education: Continue with HEP     Plan:  Times per week: 2-3x per week  Plan weeks: 8 weeks  Specific instructions for Next Treatment: Continue with ex progressing per ACL protocol of Dr. Christiano Bunch. Progress weight and reps as tolerated. Continue to work on balance. Current Treatment Recommendations: Strengthening, ROM, Gait Training, Home Exercise Program, Stair training, Modalities  Plan Comment: Continue per POC. Goals:  Patient goals : To have less knee pain and walk eventually without crutches, return to work.       Long term goals  Time Frame for Long term goals : 8 weeks 1/2/18     Long term goal 1: Patient to report of decreased pain levels at right knee to 0/10 knee pain and 0/10 IT band soreness with walking and ex program.   Long term goal 3: Patient to demonstrate increased strength at hip and knee musculature RLE 5/5 with increased stabiity in ambulation on level /unlevel terrain. Long term goal 4: Patient to demonstrate increased balance in SLS for 30 sec to 45 seconds with increased progression to single Heelraises, side stepping on treadmill. Long term goal 5: Patient to demonstrate independence in home program with ability to meet above goals and revise past 8 weeks of PT. Nivia Michelle.  Sarah, [address] [place], 1/11/2018

## 2018-01-16 ENCOUNTER — APPOINTMENT (OUTPATIENT)
Dept: PHYSICAL THERAPY | Age: 38
End: 2018-01-16
Payer: COMMERCIAL

## 2018-01-17 ENCOUNTER — HOSPITAL ENCOUNTER (OUTPATIENT)
Dept: PHYSICAL THERAPY | Age: 38
Setting detail: THERAPIES SERIES
Discharge: HOME | End: 2018-01-17
Payer: COMMERCIAL

## 2018-01-17 PROCEDURE — 97110 THERAPEUTIC EXERCISES: CPT

## 2018-01-17 ASSESSMENT — PAIN SCALES - GENERAL: PAINLEVEL_OUTOF10: 1

## 2018-01-18 ENCOUNTER — HOSPITAL ENCOUNTER (OUTPATIENT)
Dept: PHYSICAL THERAPY | Age: 38
Setting detail: THERAPIES SERIES
Discharge: HOME | End: 2018-01-18
Payer: COMMERCIAL

## 2018-01-18 PROCEDURE — 97110 THERAPEUTIC EXERCISES: CPT

## 2018-01-18 ASSESSMENT — PAIN SCALES - GENERAL: PAINLEVEL_OUTOF10: 1

## 2018-01-23 ENCOUNTER — HOSPITAL ENCOUNTER (OUTPATIENT)
Dept: PHYSICAL THERAPY | Age: 38
Setting detail: THERAPIES SERIES
Discharge: HOME | End: 2018-01-23
Payer: COMMERCIAL

## 2018-01-23 PROCEDURE — 97110 THERAPEUTIC EXERCISES: CPT

## 2018-01-23 ASSESSMENT — PAIN DESCRIPTION - PAIN TYPE: TYPE: CHRONIC PAIN;SURGICAL PAIN

## 2018-01-23 ASSESSMENT — PAIN SCALES - GENERAL: PAINLEVEL_OUTOF10: 0

## 2018-01-23 ASSESSMENT — PAIN DESCRIPTION - LOCATION: LOCATION: KNEE

## 2018-01-23 ASSESSMENT — PAIN DESCRIPTION - ORIENTATION: ORIENTATION: RIGHT

## 2018-01-23 NOTE — PROGRESS NOTES
No  Comments: Follow up with Dr. Nick Smiley on 3/13/18. Subjective: Patient states that IT band soreness has been getting less and less. Denies of any right knee pain. PT sessions going well. Continues with home ex program along with PT sessions. Patient has follow up with physician on 3/13/18. Not released yet to go back to work. Pain:  Patient Currently in Pain: No  Pain Assessment: 0-10  Pain Level: 0  Pain Type: Chronic pain, Surgical pain  Pain Location: Knee  Pain Orientation: Right      Objective                   Exercises  Exercise 1: Weighted walking 40# forward and backward, lateral to right and left x3 laps each direction. Weighted alternating forward lunges 20# x10 bilateral - cues for technique and small range, brace on  Exercise 2: Elliptical (ramp 2/resistance 2) x5 minutes (without brace)   Exercise 3: Treadmill forward 2 mph x5 minutes (demonstrating good heel strike/toe off and equal step length) then 1.2 mph x3 minutes retro, 2 minutes sidestepping on TM to right and left each at 1.0 mph. - brace on for all  NT  Exercise 4: Right knee range of motion 0 degrees to 145 degrees flexion. Exercise 5: Leg press squats 0-45 degrees with brace on 100# 2x10,  toe presses #100  x10   Exercise 6: SLS x1 minute Right; Right single leg heel raise x15  Exercise 7: Stool scoots x100 feet T  Exercise 8: Steps ups 6 inch forward and lateral Right CC x10; Right eccentric lowering 6 inch step x15 (without brace)   Exercise 9: Rockerboard forward/back, side to side x20 no UE; Balance x1 minute each way; Squats x10 each wayNT  Exercise 11: Nautilus: 4-way hip 35# x15 right/left LE NT  Exercise 12: Nautilus: Seated leg curl  45# x15 (without brace)   Exercise 13: Shallow wall sits 15x hold 5 seconds with brace on. NT  Exercise 14: Standing BAPS L2 right FWB-L toe on board for balance  F/B, S/S CW, CCW x20 with brace on NT  Exercise 19: Reassessment. Ambulation Note no antalgic gait pattern.  Equal wt shift

## 2018-01-25 ENCOUNTER — HOSPITAL ENCOUNTER (OUTPATIENT)
Dept: PHYSICAL THERAPY | Age: 38
Setting detail: THERAPIES SERIES
Discharge: HOME | End: 2018-01-25
Payer: COMMERCIAL

## 2018-01-25 PROCEDURE — 97110 THERAPEUTIC EXERCISES: CPT

## 2018-01-25 ASSESSMENT — PAIN DESCRIPTION - LOCATION: LOCATION: KNEE

## 2018-01-25 ASSESSMENT — PAIN SCALES - GENERAL: PAINLEVEL_OUTOF10: 0

## 2018-01-25 ASSESSMENT — PAIN DESCRIPTION - PAIN TYPE: TYPE: CHRONIC PAIN

## 2018-01-25 ASSESSMENT — PAIN DESCRIPTION - ORIENTATION: ORIENTATION: RIGHT

## 2018-01-30 ENCOUNTER — HOSPITAL ENCOUNTER (OUTPATIENT)
Dept: PHYSICAL THERAPY | Age: 38
Setting detail: THERAPIES SERIES
Discharge: HOME | End: 2018-01-30
Payer: COMMERCIAL

## 2018-01-30 PROCEDURE — 97110 THERAPEUTIC EXERCISES: CPT

## 2018-01-30 ASSESSMENT — PAIN SCALES - GENERAL: PAINLEVEL_OUTOF10: 0

## 2018-01-30 NOTE — PROGRESS NOTES
[place]     Time In: 1024  Time Out: 1120  Minutes: 56  Timed Code Treatment Minutes: 56 Minutes     Date: 2018  Patient Name: Gume Her,  Gender:  male        CSN: [id number]   : 1980  (42 y.o.)       Referring Practitioner: Kim Crabtree MD      Diagnosis: right ACL reconstruction   Treatment Diagnosis: s/p right knee ACL reconstruction surgery with difficulty walking and limited ADLs due to ROM and strength deficits. Additional Pertinent Hx: 17 injury to right knee with  tibial plateau fracture. 2017 continued healing fracture of tibial plateau,MRI revealed ACL tear. Surgery on 17 for ACL reconstruction with allograft                    General:  PT Visit Information  Onset Date: 17  PT Insurance Information: Medical [place]  aquatic therapy covered, modalites covered. (Patient had 18 visits prior to 18)  Total # of Visits to Date: 9  Plan of Care/Certification Expiration Date: 18  Progress Note Counter:  for PN               Subjective:  Chart Reviewed: Yes  Response To Previous Treatment: Patient with no complaints from previous session. Family / Caregiver Present: No  Comments: Follow up with Dr. Ramo Oconnor on 3/13/18. Subjective: Patient reports that his right knee is feeling pretty good. Noted working out at home on days not in therapy. Patient also plans to use employee fitness center. Pain:  Patient Currently in Pain: No  Pain Assessment: 0-10  Pain Level: 0      Objective       Exercises  Exercise 1: Weighted walking 40# forward and backward, lateral to right and left x3 laps each direction.  Weighted alternating forward lunges 25# x15 bilateral - cues for technique and small range, brace on  Exercise 2: Elliptical (ramp 3/resistance 3) x5 minutes (without brace)   Exercise 3: Treadmill forward 2.2 mph to 2.5  x5 minutes (demonstrating good heel strike/toe off and equal step length) then 1.5 mph x3 minutes retro, 2 minutes sidestepping on TM to right and left each at 1.0 mph. - brace on for all    Exercise 5: Leg press squats 0-45 degrees with brace on 100# x20 ,  toe presses #100  x15  Exercise 6:  Right single leg heel raise x15 (without brace)   Exercise 7: Stool scoots x100 feet   Exercise 8: Steps ups 6 inch forward and lateral Right CC x15; Right eccentric lowering 6 inch step x10 (without brace)   Exercise 10: Small lunges forward on BOSU x10 bilateral watching knee position and technique (without brace on)   Exercise 11: Nautilus :  4 way hip with strap at ankle: plate 1 for 15 reps (brace on)   Exercise 12: Nautilus: Seated leg curl  45# 2x15 (without brace)   Exercise 14: Standing BAPS L2 right FWB-L toe on board for balance  F/B, S/S CW, CCW x20 without brace on  Exercise 15: hydrostick with left LE on mushroom  x 10  each direction. (brace on)   Exercise 17: squats on upside down BOSU x10   Exercise 18: Nautilus: seated leg extension 25#  x10  lift with both legs and lower with right leg. Exercise 19: Lateral lunges walking sideways 4 x for 12 feet lengths each. Forward lunges 4x for 12 feet lengths each  (brace on)          Activity Tolerance:  Activity Tolerance: Patient Tolerated treatment well    Assessment: Body structures, Functions, Activity limitations: Decreased balance, Decreased strength, Decreased high-level IADLs  Assessment: progressed ex with increased reps on 4 way hip Nautilus to x15, added LAQS on Nautilus, upside down BOSU partial squats. Noting continued quad weakness and decreased balance through RLE. Prognosis: Good  Discharge Recommendations: Continue to assess pending progress    Patient Education:  Patient Education: Continue with HEP,Use employee fitness center for workouts on days when not in therapy.                        Plan:  Times per week: 1x per week  Plan weeks: 8 weeks  Specific instructions for

## 2018-02-06 ENCOUNTER — HOSPITAL ENCOUNTER (OUTPATIENT)
Dept: PHYSICAL THERAPY | Age: 38
Setting detail: THERAPIES SERIES
Discharge: HOME | End: 2018-02-06
Payer: COMMERCIAL

## 2018-02-06 PROCEDURE — 97110 THERAPEUTIC EXERCISES: CPT

## 2018-02-06 ASSESSMENT — PAIN DESCRIPTION - ORIENTATION: ORIENTATION: RIGHT

## 2018-02-06 ASSESSMENT — PAIN DESCRIPTION - LOCATION: LOCATION: KNEE

## 2018-02-06 ASSESSMENT — PAIN DESCRIPTION - PAIN TYPE: TYPE: CHRONIC PAIN

## 2018-02-06 ASSESSMENT — PAIN SCALES - GENERAL: PAINLEVEL_OUTOF10: 1

## 2018-02-06 ASSESSMENT — PAIN DESCRIPTION - DIRECTION: RADIATING_TOWARDS: RIGHT ANTERIOR KNEE.

## 2018-02-06 NOTE — PROGRESS NOTES
Treadmill forward 2.2 mph to 2.5  x5 minutes (demonstrating good heel strike/toe off and equal step length) then 1.5 mph x3 minutes retro, 2 minutes sidestepping on TM to right and left each at 1.2 mph. - brace on for all    Exercise 5: Leg press squats 0-45 degrees with brace on 100# x20 ,  toe presses #100  2x10  Exercise 7: Stool scoots x100 feet   Exercise 8: Steps ups 6 inch forward and lateral Right CC x15; Right eccentric lowering 6 inch step x10 (without brace)   Exercise 11: Nautilus :  4 way hip with strap at ankle: plate 1 for 15 reps (brace on)   Exercise 12: Nautilus: Seated leg curl  45# 2x15 (without brace)   Exercise 15: hydrostick with left LE on mushroom  x 10  each direction. (brace on)   Exercise 18: Nautilus: seated leg extension 25#  x10  lift with both legs and lower with right leg. Exercise 20: ICE to posterior/anterior right knee per patient request.        Activity Tolerance: Tolerated well. Assessment: Body structures, Functions, Activity limitations: Decreased functional mobility , Decreased strength, Decreased high-level IADLs, Decreased balance  Assessment: Per P.Tsamantha romero for this session due to increase soreness. Patient denies pain after session. Discharge Recommendations: Continue to assess pending progress    Patient Education:  Patient Education: Continue with HEP,Use [employer] center for workouts on days when not in therapy. Plan:  Times per week: 1x per week  Plan weeks: 8 weeks  Specific instructions for Next Treatment: Continue with ex progressing per ACL protocol of Dr. Phillip Servin. Progress weight and reps as tolerated. Continue to work on balance. Current Treatment Recommendations: Strengthening, Balance Training, Home Exercise Program  Plan Comment: Continue per POC. Goals:  Patient goals : To have less knee pain and walk eventually without crutches, return to work.   GOAL PARTIALLY MET Arianna has 0/10 iknee pain and has not

## 2018-02-13 ENCOUNTER — HOSPITAL ENCOUNTER (OUTPATIENT)
Dept: PHYSICAL THERAPY | Age: 38
Setting detail: THERAPIES SERIES
Discharge: HOME | End: 2018-02-13
Payer: COMMERCIAL

## 2018-02-13 PROCEDURE — 97110 THERAPEUTIC EXERCISES: CPT

## 2018-02-13 ASSESSMENT — PAIN SCALES - GENERAL: PAINLEVEL_OUTOF10: 2

## 2018-02-13 NOTE — PROGRESS NOTES
[place]     Time In: 0845  Time Out: 321 [facility]  Minutes: 58  Timed Code Treatment Minutes: 48 Minutes     Date: 2018  Patient Name: Jimena Sanchez,  Gender:  male        CSN: [id number]   : 1980  (42 y.o.)       Referring Practitioner: Jeremie Bach MD      Diagnosis: right ACL reconstruction   Treatment Diagnosis: s/p right knee ACL reconstruction surgery with difficulty walking and limited ADLs due to ROM and strength deficits. Additional Pertinent Hx: 17 injury to right knee with  tibial plateau fracture. 2017 continued healing fracture of tibial plateau,MRI revealed ACL tear. Surgery on 17 for ACL reconstruction with allograft         General:  PT Visit Information  Onset Date: 17  PT Insurance Information: Medical [place]  aquatic therapy covered, modalites covered. (Patient had 18 visits prior to 18)  Total # of Visits to Date: 11  Plan of Care/Certification Expiration Date: 18  Progress Note Counter:  for PN             Subjective:  Chart Reviewed: Yes  Response To Previous Treatment: Patient with no complaints from previous session. Family / Caregiver Present: No  Comments: Follow up with Dr. Curry Balderrama on 3/13/18. Subjective: Patient reports he has been having more soreness over the past week that doesn't seem to be going away. States that the knee has been feeling a little more warm also. Patient has an appointment to see Dr. Curry Balderrama tomorrow. Pain:  Patient Currently in Pain: Yes  Pain Assessment: 0-10  Pain Level: 2 (Right knee)    Objective         Exercises  Exercise 1: Weighted walking 40# forward and backward, lateral to right and left x3 laps each direction.   Weighted alternating forward lunges 25# x10 bilateral - cues for technique and small range (brace on)  Exercise 2: Elliptical (ramp 3/resistance 3) x5 minutes (with brace)   Exercise 3: Treadmill forward 2.5 mph x5 minutes (demonstrating good heel strike/toe off and equal step length) then 1.6 mph x3 minutes retro, 2 minutes sidestepping on TM to right and left each at 1.4 mph - brace on for all    Exercise 5: Leg press squats 0-45 degrees with brace on 100# x20, toe presses #100  2x10  Exercise 7: Stool scoots x100 feet   Exercise 8: Steps ups 6 inch forward and lateral Right CC x15; Right eccentric lowering 6 inch step x10 (with brace)   Exercise 11: Nautilus :  4-way hip with strap at ankle: plate 1 N93 reps Bilateral (brace on)   Exercise 12: Nautilus: Seated leg curl  45# 2x15 (with brace)   Exercise 15: Hydrostick with left LE on mushroom  x 10  each direction. (brace on)   Exercise 18: Nautilus: seated leg extension 25#  x10  lift with both legs and lower with right leg. Exercise 20: ICE to posterior/anterior right knee per patient request last 5 minutes of session. Activity Tolerance:  Activity Tolerance: Patient Tolerated treatment well    Assessment: Body structures, Functions, Activity limitations: Decreased functional mobility , Decreased strength, Decreased high-level IADLs, Decreased balance  Assessment: Patient wearing brace for all activities and monitored pain throughout session due to increased soreness over past 1-2 weeks. Ice at end of session due to soreness and swelling. Patient to see doctor tomorrow. Discharge Recommendations: Continue to assess pending progress    Patient Education:  Patient Education: Continue with HEP, Use [employer] fitness center for workouts on days when not in therapy. Ice. Plan:  Times per week: 1x per week  Plan weeks: 8 weeks  Specific instructions for Next Treatment: Continue with ex progressing per ACL protocol of Dr. Lucia Rodriguez. Progress weight and reps as tolerated. Continue to work on balance. Current Treatment Recommendations: Strengthening, Balance Training, Home Exercise Program  Plan Comment: Continue per POC.     Goals:  Patient

## 2018-02-20 ENCOUNTER — APPOINTMENT (OUTPATIENT)
Dept: PHYSICAL THERAPY | Age: 38
End: 2018-02-20
Payer: COMMERCIAL

## 2018-02-27 ENCOUNTER — APPOINTMENT (OUTPATIENT)
Dept: PHYSICAL THERAPY | Age: 38
End: 2018-02-27
Payer: COMMERCIAL

## 2018-03-06 ENCOUNTER — APPOINTMENT (OUTPATIENT)
Dept: PHYSICAL THERAPY | Age: 38
End: 2018-03-06
Payer: COMMERCIAL

## 2018-03-12 ENCOUNTER — HOSPITAL ENCOUNTER (OUTPATIENT)
Dept: PHYSICAL THERAPY | Age: 38
Setting detail: THERAPIES SERIES
Discharge: HOME | End: 2018-03-12
Payer: COMMERCIAL

## 2018-03-12 PROCEDURE — 97110 THERAPEUTIC EXERCISES: CPT

## 2018-03-12 ASSESSMENT — PAIN SCALES - GENERAL: PAINLEVEL_OUTOF10: 0

## 2018-03-20 VITALS — BODY MASS INDEX: 24.13 KG/M2 | WEIGHT: 173 LBS

## 2018-05-31 LAB
CHOLESTEROL, TOTAL: 201 MG/DL (ref 0–199)
FASTING: YES
GLUCOSE BLD-MCNC: 84 MG/DL (ref 74–109)
HDLC SERPL-MCNC: 45 MG/DL (ref 40–90)
LDL CHOLESTEROL CALCULATED: 143 MG/DL
TRIGL SERPL-MCNC: 63 MG/DL (ref 0–199)

## 2018-06-01 ENCOUNTER — EMPLOYEE WELLNESS (OUTPATIENT)
Dept: OTHER | Age: 38
End: 2018-06-01

## 2018-06-11 VITALS — WEIGHT: 173 LBS | BODY MASS INDEX: 24.13 KG/M2

## 2020-09-21 ENCOUNTER — EMPLOYEE WELLNESS (OUTPATIENT)
Dept: OTHER | Age: 40
End: 2020-09-21

## 2020-09-21 LAB
CHOLESTEROL, TOTAL: 213 MG/DL (ref 0–199)
FASTING: YES
GLUCOSE BLD-MCNC: 98 MG/DL (ref 74–109)
HDLC SERPL-MCNC: 41 MG/DL (ref 40–90)
LDL CHOLESTEROL CALCULATED: 161 MG/DL
TRIGL SERPL-MCNC: 56 MG/DL (ref 0–199)

## 2020-10-19 VITALS — WEIGHT: 176 LBS | BODY MASS INDEX: 24.55 KG/M2

## 2021-09-10 LAB
CHOLESTEROL, TOTAL: 227 MG/DL (ref 0–199)
FASTING: YES
GLUCOSE BLD-MCNC: 105 MG/DL (ref 74–109)
HDLC SERPL-MCNC: 40 MG/DL (ref 40–90)
LDL CHOLESTEROL CALCULATED: 161 MG/DL
TRIGL SERPL-MCNC: 130 MG/DL (ref 0–199)

## 2023-01-11 NOTE — PROGRESS NOTES
Noelle, OT with [facility], calling clinic to report first visit was completed today at noon and patient is not appropriate for OT at this time.     Per Noelle, patient is unable to get out of bed because of 8/10 pain in LE. Noelle states she has recommended patient seek medical attention because patient has been in bed for days.    Pt is dependant on mother, who has injured leg and is also using wheelchair to get around the home at this time. Mother is able to provide meals and sponge baths, and is still able to lift pt in mendez lift enough to place bedpan.    Per chart review, patient recently had VV 1/6/23 and hip pain/leg pain was discussed.     Occupational Therapy eval  He should see orthopedic to have his hip and legs reevaluated.  Will try gabapentin at very low dose, 100 mg 3 times daily, to see if we can help with his pain.  I discussed sedation and the need to call me if side effects.  He is not ambulatory or getting up on his own so we should not have to worry about his falling.         on  Exercise 2: Elliptical (ramp 2/resistance 2) x5 minutes (without brace)   Exercise 3: Treadmill forward 2 mph x5 minutes (demonstrating good heel strike/toe off and equal step length) then 1.5 mph x3 minutes retro, 2 minutes sidestepping on TM to right and left each at 1.0 mph. - brace on for all    Exercise 5: Leg press squats 0-45 degrees with brace on 100# x20 ,  toe presses #100  x15  Exercise 6:  Right single leg heel raise x15 (without brace)   Exercise 7: Stool scoots x100 feet   Exercise 8: Steps ups 6 inch forward and lateral Right CC x15; Right eccentric lowering 6 inch step x10 (without brace)   Exercise 9: Rockerboard forward/back, side to side x20 no UE; Balance x 30 seconds each way; Squats x10 forward  (without brace)   Exercise 10: Small lunges forward on BOSU x10 bilateral watching knee position and technique (brace on)   Exercise 11: Nautilus :  4 way hip with strap at ankle: plate 1 for 10 reps (brace on)   Exercise 12: Nautilus: Seated leg curl  45# x15 (without brace)   Exercise 13: Shallow wall sits 15x hold 5 seconds without brace on  Exercise 14: Standing BAPS L2 right FWB-L toe on board for balance  F/B, S/S CW, CCW x20 without brace on  Exercise 15: hydrostick with left LE on mushroom  x 10  each direction. Exercise 16: Patient showed employee fitness area and ex exercises he could work on in this area on his days not in therapy. Activity Tolerance:  Activity Tolerance: Patient Tolerated treatment well    Assessment: Body structures, Functions, Activity limitations: Decreased balance, Decreased strength, Decreased high-level IADLs  Assessment: Progressed patient to  Nautilus with 4 way hips, progressed some of ex to without brace on. Added hydrostick with single leg stance and use of musthroom under LLE as needed. Patient showed employee fitness center on ex he could work on in this area when not in therapy.     Prognosis: Good  Discharge Recommendations:

## 2024-07-14 ENCOUNTER — HOSPITAL ENCOUNTER (INPATIENT)
Age: 44
LOS: 1 days | Discharge: HOME | DRG: 694 | End: 2024-07-15
Attending: STUDENT IN AN ORGANIZED HEALTH CARE EDUCATION/TRAINING PROGRAM | Admitting: PHYSICIAN ASSISTANT

## 2024-07-14 PROBLEM — N20.0 NEPHROLITHIASIS: Status: ACTIVE | Noted: 2024-07-14

## 2024-07-14 LAB
ANION GAP SERPL CALC-SCNC: 10 MEQ/L (ref 8–16)
BASOPHILS ABSOLUTE: 0 THOU/MM3 (ref 0–0.1)
BASOPHILS NFR BLD AUTO: 0.2 %
BUN SERPL-MCNC: 18 MG/DL (ref 7–22)
CALCIUM SERPL-MCNC: 8.4 MG/DL (ref 8.5–10.5)
CHLORIDE SERPL-SCNC: 104 MEQ/L (ref 98–111)
CO2 SERPL-SCNC: 24 MEQ/L (ref 23–33)
CREAT SERPL-MCNC: 0.8 MG/DL (ref 0.4–1.2)
DEPRECATED RDW RBC AUTO: 44.7 FL (ref 35–45)
EOSINOPHIL NFR BLD AUTO: 0.1 %
EOSINOPHILS ABSOLUTE: 0 THOU/MM3 (ref 0–0.4)
ERYTHROCYTE [DISTWIDTH] IN BLOOD BY AUTOMATED COUNT: 13.2 % (ref 11.5–14.5)
GFR SERPL CREATININE-BSD FRML MDRD: > 90 ML/MIN/1.73M2
GLUCOSE SERPL-MCNC: 106 MG/DL (ref 70–108)
HCT VFR BLD AUTO: 38.1 % (ref 42–52)
HGB BLD-MCNC: 12.5 GM/DL (ref 14–18)
IMM GRANULOCYTES # BLD AUTO: 0.04 THOU/MM3 (ref 0–0.07)
IMM GRANULOCYTES NFR BLD AUTO: 0.3 %
LYMPHOCYTES ABSOLUTE: 1.7 THOU/MM3 (ref 1–4.8)
LYMPHOCYTES NFR BLD AUTO: 13.5 %
MCH RBC QN AUTO: 30 PG (ref 26–33)
MCHC RBC AUTO-ENTMCNC: 32.8 GM/DL (ref 32.2–35.5)
MCV RBC AUTO: 91.6 FL (ref 80–94)
MONOCYTES ABSOLUTE: 0.7 THOU/MM3 (ref 0.4–1.3)
MONOCYTES NFR BLD AUTO: 5.8 %
NEUTROPHILS ABSOLUTE: 10.3 THOU/MM3 (ref 1.8–7.7)
NEUTROPHILS NFR BLD AUTO: 80.1 %
NRBC BLD AUTO-RTO: 0 /100 WBC
PLATELET # BLD AUTO: 255 THOU/MM3 (ref 130–400)
PMV BLD AUTO: 10.2 FL (ref 9.4–12.4)
POTASSIUM SERPL-SCNC: 4 MEQ/L (ref 3.5–5.2)
RBC # BLD AUTO: 4.16 MILL/MM3 (ref 4.7–6.1)
SODIUM SERPL-SCNC: 138 MEQ/L (ref 135–145)
WBC # BLD AUTO: 12.8 THOU/MM3 (ref 4.8–10.8)

## 2024-07-14 PROCEDURE — 85025 COMPLETE CBC W/AUTO DIFF WBC: CPT

## 2024-07-14 PROCEDURE — 1200000000 HC SEMI PRIVATE

## 2024-07-14 PROCEDURE — 80048 BASIC METABOLIC PNL TOTAL CA: CPT

## 2024-07-14 PROCEDURE — 36415 COLL VENOUS BLD VENIPUNCTURE: CPT

## 2024-07-14 PROCEDURE — 99222 1ST HOSP IP/OBS MODERATE 55: CPT | Performed by: PHYSICIAN ASSISTANT

## 2024-07-14 RX ORDER — MAGNESIUM SULFATE IN WATER 40 MG/ML
2000 INJECTION, SOLUTION INTRAVENOUS PRN
Status: DISCONTINUED | OUTPATIENT
Start: 2024-07-14 | End: 2024-07-15 | Stop reason: HOSPADM

## 2024-07-14 RX ORDER — ACETAMINOPHEN 650 MG/1
650 SUPPOSITORY RECTAL EVERY 6 HOURS PRN
Status: DISCONTINUED | OUTPATIENT
Start: 2024-07-14 | End: 2024-07-15 | Stop reason: HOSPADM

## 2024-07-14 RX ORDER — LANOLIN ALCOHOL/MO/W.PET/CERES
6 CREAM (GRAM) TOPICAL NIGHTLY PRN
Status: DISCONTINUED | OUTPATIENT
Start: 2024-07-14 | End: 2024-07-15 | Stop reason: HOSPADM

## 2024-07-14 RX ORDER — SODIUM CHLORIDE 0.9 % (FLUSH) 0.9 %
10 SYRINGE (ML) INJECTION EVERY 12 HOURS SCHEDULED
Status: DISCONTINUED | OUTPATIENT
Start: 2024-07-14 | End: 2024-07-15 | Stop reason: HOSPADM

## 2024-07-14 RX ORDER — SODIUM CHLORIDE 9 MG/ML
INJECTION, SOLUTION INTRAVENOUS PRN
Status: DISCONTINUED | OUTPATIENT
Start: 2024-07-14 | End: 2024-07-15 | Stop reason: HOSPADM

## 2024-07-14 RX ORDER — POTASSIUM CHLORIDE 7.45 MG/ML
10 INJECTION INTRAVENOUS PRN
Status: DISCONTINUED | OUTPATIENT
Start: 2024-07-14 | End: 2024-07-15 | Stop reason: HOSPADM

## 2024-07-14 RX ORDER — GABAPENTIN 300 MG/1
1 CAPSULE ORAL EVERY 8 HOURS
COMMUNITY
Start: 2024-04-29

## 2024-07-14 RX ORDER — ONDANSETRON 4 MG/1
4 TABLET, ORALLY DISINTEGRATING ORAL EVERY 8 HOURS PRN
Status: DISCONTINUED | OUTPATIENT
Start: 2024-07-14 | End: 2024-07-15 | Stop reason: HOSPADM

## 2024-07-14 RX ORDER — POTASSIUM CHLORIDE 20 MEQ/1
40 TABLET, EXTENDED RELEASE ORAL PRN
Status: DISCONTINUED | OUTPATIENT
Start: 2024-07-14 | End: 2024-07-15 | Stop reason: HOSPADM

## 2024-07-14 RX ORDER — ACETAMINOPHEN 325 MG/1
650 TABLET ORAL EVERY 6 HOURS PRN
Status: DISCONTINUED | OUTPATIENT
Start: 2024-07-14 | End: 2024-07-15 | Stop reason: HOSPADM

## 2024-07-14 RX ORDER — POLYETHYLENE GLYCOL 3350 17 G/17G
17 POWDER, FOR SOLUTION ORAL DAILY PRN
Status: DISCONTINUED | OUTPATIENT
Start: 2024-07-14 | End: 2024-07-15 | Stop reason: HOSPADM

## 2024-07-14 RX ORDER — ENOXAPARIN SODIUM 100 MG/ML
40 INJECTION SUBCUTANEOUS DAILY
Status: DISCONTINUED | OUTPATIENT
Start: 2024-07-15 | End: 2024-07-15

## 2024-07-14 RX ORDER — SODIUM CHLORIDE 0.9 % (FLUSH) 0.9 %
10 SYRINGE (ML) INJECTION PRN
Status: DISCONTINUED | OUTPATIENT
Start: 2024-07-14 | End: 2024-07-15 | Stop reason: HOSPADM

## 2024-07-14 RX ORDER — ONDANSETRON 2 MG/ML
4 INJECTION INTRAMUSCULAR; INTRAVENOUS EVERY 6 HOURS PRN
Status: DISCONTINUED | OUTPATIENT
Start: 2024-07-14 | End: 2024-07-15 | Stop reason: HOSPADM

## 2024-07-15 ENCOUNTER — APPOINTMENT (OUTPATIENT)
Dept: GENERAL RADIOLOGY | Age: 44
DRG: 694 | End: 2024-07-15
Attending: STUDENT IN AN ORGANIZED HEALTH CARE EDUCATION/TRAINING PROGRAM

## 2024-07-15 ENCOUNTER — APPOINTMENT (OUTPATIENT)
Dept: ULTRASOUND IMAGING | Age: 44
DRG: 694 | End: 2024-07-15
Attending: STUDENT IN AN ORGANIZED HEALTH CARE EDUCATION/TRAINING PROGRAM

## 2024-07-15 ENCOUNTER — TELEPHONE (OUTPATIENT)
Dept: UROLOGY | Age: 44
End: 2024-07-15

## 2024-07-15 VITALS
HEART RATE: 56 BPM | RESPIRATION RATE: 16 BRPM | BODY MASS INDEX: 25.2 KG/M2 | WEIGHT: 180 LBS | TEMPERATURE: 97.3 F | HEIGHT: 71 IN | DIASTOLIC BLOOD PRESSURE: 77 MMHG | SYSTOLIC BLOOD PRESSURE: 115 MMHG | OXYGEN SATURATION: 100 %

## 2024-07-15 LAB
ANION GAP SERPL CALC-SCNC: 9 MEQ/L (ref 8–16)
BACTERIA: ABNORMAL
BASOPHILS ABSOLUTE: 0 THOU/MM3 (ref 0–0.1)
BASOPHILS NFR BLD AUTO: 0.3 %
BILIRUB UR QL STRIP: NEGATIVE
BUN SERPL-MCNC: 14 MG/DL (ref 7–22)
CALCIUM SERPL-MCNC: 8.6 MG/DL (ref 8.5–10.5)
CASTS #/AREA URNS LPF: ABNORMAL /LPF
CASTS #/AREA URNS LPF: ABNORMAL /LPF
CHARACTER UR: CLEAR
CHARCOAL URNS QL MICRO: ABNORMAL
CHLORIDE SERPL-SCNC: 105 MEQ/L (ref 98–111)
CO2 SERPL-SCNC: 25 MEQ/L (ref 23–33)
COLOR UR: YELLOW
CREAT SERPL-MCNC: 0.9 MG/DL (ref 0.4–1.2)
CRYSTALS URNS QL MICRO: ABNORMAL
DEPRECATED RDW RBC AUTO: 45.1 FL (ref 35–45)
EOSINOPHIL NFR BLD AUTO: 0.6 %
EOSINOPHILS ABSOLUTE: 0.1 THOU/MM3 (ref 0–0.4)
EPITHELIAL CELLS, UA: ABNORMAL /HPF
ERYTHROCYTE [DISTWIDTH] IN BLOOD BY AUTOMATED COUNT: 13.3 % (ref 11.5–14.5)
GFR SERPL CREATININE-BSD FRML MDRD: > 90 ML/MIN/1.73M2
GLUCOSE SERPL-MCNC: 94 MG/DL (ref 70–108)
GLUCOSE UR QL STRIP.AUTO: NEGATIVE MG/DL
HCT VFR BLD AUTO: 40.8 % (ref 42–52)
HGB BLD-MCNC: 13.2 GM/DL (ref 14–18)
HGB UR QL STRIP.AUTO: ABNORMAL
IMM GRANULOCYTES # BLD AUTO: 0.03 THOU/MM3 (ref 0–0.07)
IMM GRANULOCYTES NFR BLD AUTO: 0.3 %
KETONES UR QL STRIP.AUTO: 40
LEUKOCYTE ESTERASE UR QL STRIP.AUTO: NEGATIVE
LYMPHOCYTES ABSOLUTE: 2.4 THOU/MM3 (ref 1–4.8)
LYMPHOCYTES NFR BLD AUTO: 24.3 %
MCH RBC QN AUTO: 29.8 PG (ref 26–33)
MCHC RBC AUTO-ENTMCNC: 32.4 GM/DL (ref 32.2–35.5)
MCV RBC AUTO: 92.1 FL (ref 80–94)
MONOCYTES ABSOLUTE: 0.7 THOU/MM3 (ref 0.4–1.3)
MONOCYTES NFR BLD AUTO: 7.4 %
NEUTROPHILS ABSOLUTE: 6.6 THOU/MM3 (ref 1.8–7.7)
NEUTROPHILS NFR BLD AUTO: 67.1 %
NITRITE UR QL STRIP.AUTO: NEGATIVE
NRBC BLD AUTO-RTO: 0 /100 WBC
PH UR STRIP.AUTO: 6 [PH] (ref 5–9)
PLATELET # BLD AUTO: 261 THOU/MM3 (ref 130–400)
PMV BLD AUTO: 10.2 FL (ref 9.4–12.4)
POTASSIUM SERPL-SCNC: 4.2 MEQ/L (ref 3.5–5.2)
PROT UR STRIP.AUTO-MCNC: NEGATIVE MG/DL
RBC # BLD AUTO: 4.43 MILL/MM3 (ref 4.7–6.1)
RBC #/AREA URNS HPF: ABNORMAL /HPF
RENAL EPI CELLS #/AREA URNS HPF: ABNORMAL /[HPF]
SODIUM SERPL-SCNC: 139 MEQ/L (ref 135–145)
SPECIFIC GRAVITY UA: 1.02 (ref 1–1.03)
UROBILINOGEN, URINE: 0.2 EU/DL (ref 0–1)
WBC # BLD AUTO: 9.9 THOU/MM3 (ref 4.8–10.8)
WBC #/AREA URNS HPF: ABNORMAL /HPF
YEAST LIKE FUNGI URNS QL MICRO: ABNORMAL

## 2024-07-15 PROCEDURE — 2580000003 HC RX 258: Performed by: PHYSICIAN ASSISTANT

## 2024-07-15 PROCEDURE — 36415 COLL VENOUS BLD VENIPUNCTURE: CPT

## 2024-07-15 PROCEDURE — 99254 IP/OBS CNSLTJ NEW/EST MOD 60: CPT

## 2024-07-15 PROCEDURE — 76770 US EXAM ABDO BACK WALL COMP: CPT

## 2024-07-15 PROCEDURE — 85025 COMPLETE CBC W/AUTO DIFF WBC: CPT

## 2024-07-15 PROCEDURE — 87086 URINE CULTURE/COLONY COUNT: CPT

## 2024-07-15 PROCEDURE — 80048 BASIC METABOLIC PNL TOTAL CA: CPT

## 2024-07-15 PROCEDURE — 99238 HOSP IP/OBS DSCHRG MGMT 30/<: CPT | Performed by: PHYSICIAN ASSISTANT

## 2024-07-15 PROCEDURE — 74018 RADEX ABDOMEN 1 VIEW: CPT

## 2024-07-15 PROCEDURE — 81001 URINALYSIS AUTO W/SCOPE: CPT

## 2024-07-15 PROCEDURE — 6370000000 HC RX 637 (ALT 250 FOR IP)

## 2024-07-15 RX ORDER — TAMSULOSIN HYDROCHLORIDE 0.4 MG/1
0.4 CAPSULE ORAL DAILY
Status: DISCONTINUED | OUTPATIENT
Start: 2024-07-15 | End: 2024-07-15 | Stop reason: HOSPADM

## 2024-07-15 RX ADMIN — TAMSULOSIN HYDROCHLORIDE 0.4 MG: 0.4 CAPSULE ORAL at 10:38

## 2024-07-15 RX ADMIN — SODIUM CHLORIDE, PRESERVATIVE FREE 10 ML: 5 INJECTION INTRAVENOUS at 09:06

## 2024-07-15 RX ADMIN — SODIUM CHLORIDE, PRESERVATIVE FREE 10 ML: 5 INJECTION INTRAVENOUS at 00:19

## 2024-07-15 ASSESSMENT — ENCOUNTER SYMPTOMS
CHEST TIGHTNESS: 0
SHORTNESS OF BREATH: 0
VOMITING: 0
NAUSEA: 0
BACK PAIN: 0

## 2024-07-15 NOTE — H&P
Hospitalist History & Physical         Patient: Drew Diez 44 y.o. male      : 1980  Date of Admission: 2024  Date of Service: Pt seen/examined on 24 and Admitted to Inpatient with expected LOS greater than two midnights due to medical therapy.         ASSESSMENT AND PLAN    Left nephrolithiasis   4 mm proximal ureteral stone on CT  Urology consult  Pain control- dilaudid pain panel- can add toradol   Flomax- was given at [facility]   NPO status     Anemia   12.5- Mild- appears stable - possibly dilutional or from labs  Monitor   Transfuse if hgb < 7         Data reviewed (unless otherwise discussed in assessment/plan)    Imaging: I have reviewed Ct abdomen and pelvis with the following interpretation:   4 mm left ureteral stone   Labs: Reviewed, see chart and plan above.       =======================================================================    SUBJECTIVE    Chief Complaint:  flank pain     History Of Present Illness:  Drew Diez is a 44 y.o. male   who presents to [facility] with flank pain.  Patient states early in the afternoon he started having progressively worse flank pain. At first he believed it was a pulled muscle but it continued to worsen until it was much too severe to tolerate. He states that morphine did not help his pain much and toradol provided relief for about 30 minutes. He had decent pain relief with dilaudid. Patient was found to have left 4 mm stone on CT   Patient denies chest pain, SOB, fevers, chills.         Review of Systems:   Pertinent positives and negatives as noted in the HPI. Otherwise complete ROS negative.    OBJECTIVE  Physical Exam:  There were no vitals taken for this visit.  General appearance: No apparent distress, appears stated age.  Eyes:  Pupils equal, round, and reactive to light. Conjunctivae/corneas clear.  HENT: Head normal in appearance. External nares normal.  Oral mucosa moist without lesions.  Hearing grossly

## 2024-07-15 NOTE — CARE COORDINATION
Case Management Assessment Initial Evaluation    Date/Time of Evaluation: 7/15/2024 7:46 AM  Assessment Completed by: Lyssa Dent RN    If patient is discharged prior to next notation, then this note serves as note for discharge by case management.    Patient Name: Drew Diez                   YOB: 1980  Diagnosis: Nephrolithiasis [N20.0]                   Date / Time: 7/14/2024  9:41 PM  Location: [address] [place] [address]     Patient Admission Status: Inpatient   Readmission Risk Low 0-14, Mod 15-19), High > 20: Readmission Risk Score: 4    Current PCP: Francois Pearson MD    Additional Case Management Notes: to ED  at [place], came here [facility]  ED with c/o left flank pain   Urology consulted, IVF, pain control.  Procedures: na    Imaging:   CT abd pelvis: left 4 mm proximal ureteral stone     Patient Goals/Plan/Treatment Preferences: Met with Star and his wife Madeline; he lives home with wife and family. He has PCP who he has not seen in close to 2 years, does not require any DME, and is \"self pay\" per patient. Declined HH or needs at discharge.              07/15/24 1234   Service Assessment   Patient Orientation Alert and Oriented   Cognition Alert   History Provided By Patient   Primary Caregiver Self   Accompanied By/Relationship Madeline   Support Systems Spouse/Significant Other;Children   Patient's Healthcare Decision Maker is: Legal Next of Kin   PCP Verified by CM Yes   Last Visit to PCP Within last two years   Prior Functional Level Independent in ADLs/IADLs   Current Functional Level Independent in ADLs/IADLs   Can patient return to prior living arrangement Yes   Ability to make needs known: Good   Family able to assist with home care needs: Yes   Would you like for me to discuss the discharge plan with any other family members/significant others, and if so, who? No   Financial Resources Other (Comment)  (self pay)   Community Resources None   CM/SW Referral Activity/Exercise   Social/Functional

## 2024-07-15 NOTE — PROGRESS NOTES
Went over discharge discharge instructions and follow up appointments. Also went over medications. Discharged per ambulatory with wife.

## 2024-07-15 NOTE — PROGRESS NOTES
Pt admitted to  7K10 from a direct admit from [place].    Complaints: kidney stone.       Vital signs obtained. Assessment and data collection initiated.     Two nurse skin assessment performed by Sonal  RN and Guero RN. Oriented to room.     Explained patients right to have family, representative or physician notified of their admission.  Patient has Declined for physician to be notified.  Patient has wife at bedside.    The patient is interested in [facility] meds to [facility] program?:  No    Policies and procedures for  explained. All questions answered with no further questions at this time. Fall prevention and safety brochure discussed with patient.

## 2024-07-15 NOTE — TELEPHONE ENCOUNTER
Patient needs office visit in 6-8 weeks to establish care in the office.   Seen in the hospital for kidney stone

## 2024-07-15 NOTE — CONSULTS
passage, ureteroscopic management. Discussed risks, benefits, alternatives of each. Discussed complication and expectations.   -Patient would like to check KUB and Renal US imaging to assess for continued presence of the stone. He is also leaning towards a trial of passage vs surgical intervention.       -Will check CBC, BMP, renal US, and KUB.        Thank you for including us in the care of Drew Rg Dixon PA-C  07/15/24 8:23 AM  Urology

## 2024-07-15 NOTE — DISCHARGE SUMMARY
Hospital Medicine Discharge Summary      Patient Identification:   Drew Diez   : 1980  MRN: [id number]   Account: [id number]      Patient's PCP: Francois Peasron MD    Admit Date: 2024     Discharge Date:   7/15/2024    Admitting Physician: No admitting provider for patient encounter.     Discharging Physician Assistant: Marianne Chambers PA-C     Discharge Diagnoses with Assessment/Plan:    Left 4 mm proximal ureteral stone with mild to moderate hydronephrosis- resolved   Urology consulted and signed off giving passage of stone. Follow up in 6-8 weeks   KUB and renal ultrasound without stone or hydronephrosis   Urine without signs of infection.    The patient was seen and examined on day of discharge and this discharge summary is in conjunction with any daily progress note from day of discharge.    Hospital Course:   Drew Diez is a 44 y.o. male admitted to [facility] on 2024 for left urolithiasis.  Was a transfer from [facility] after CT imaging noting a left 4 mm proximal ureteral stone.  Urology was consulted and KUB and renal ultrasound were obtained this morning.  Patient without pain overnight.  Imaging negative for any stone or hydronephrosis.   Urine negative for bacteria      Suspected that patient has passed stone.  He is hemodynamically stable.  Discussed case with urology. OK to DC home with outpatient follow up in the coming weeks.       Exam:     Vitals:  Vitals:    24 2201 07/15/24 0545 07/15/24 0900 07/15/24 1215   BP:  108/68 108/68 115/77   Pulse:  69 61 56   Resp:  18 18 16   Temp:  98 °F (36.7 °C) 98.4 °F (36.9 °C) 97.3 °F (36.3 °C)   TempSrc:  Oral Oral Oral   SpO2:  100% 99% 100%   Weight: 81.6 kg (180 lb)      Height: 1.803 m (5' 11\")        Weight: Weight - Scale: 81.6 kg (180 lb)     24 hour intake/output:  Intake/Output Summary (Last 24 hours) at 7/15/2024 1304  Last data filed at 7/15/2024 0843  Gross per 24 hour   Intake 10

## 2024-07-15 NOTE — PLAN OF CARE
Reviewed results of renal US and KUB. No evidence of the stone on KUB. Renal US without hydro. Patient denies pain.       Okay for discharge from Urology Standpoint.   Discussed with the hospitalist.     Follow-up to establish care in 6-8 weeks.

## 2024-07-17 LAB — BACTERIA UR CULT: NORMAL

## 2024-07-21 ENCOUNTER — HOSPITAL ENCOUNTER (OUTPATIENT)
Dept: CT IMAGING | Age: 44
Discharge: HOME | End: 2024-07-21
Attending: RADIOLOGY

## 2024-07-21 DIAGNOSIS — Z00.6 ENCOUNTER FOR EXAMINATION FOR NORMAL COMPARISON OR CONTROL IN CLINICAL RESEARCH PROGRAM: ICD-10-CM
